# Patient Record
Sex: FEMALE | Race: BLACK OR AFRICAN AMERICAN | NOT HISPANIC OR LATINO | Employment: OTHER | URBAN - METROPOLITAN AREA
[De-identification: names, ages, dates, MRNs, and addresses within clinical notes are randomized per-mention and may not be internally consistent; named-entity substitution may affect disease eponyms.]

---

## 2023-05-15 DIAGNOSIS — I10 HYPERTENSION, UNSPECIFIED TYPE: Primary | ICD-10-CM

## 2023-05-15 RX ORDER — TRIAMTERENE/HYDROCHLOROTHIAZID 37.5-25 MG
1 TABLET ORAL DAILY
COMMUNITY
Start: 2013-03-25 | End: 2023-05-15 | Stop reason: SDUPTHER

## 2023-05-15 RX ORDER — TRIAMTERENE/HYDROCHLOROTHIAZID 37.5-25 MG
1 TABLET ORAL DAILY
Qty: 30 TABLET | Refills: 0 | Status: SHIPPED | OUTPATIENT
Start: 2023-05-15 | End: 2023-06-16 | Stop reason: SDUPTHER

## 2023-06-12 DIAGNOSIS — I10 HYPERTENSION, UNSPECIFIED TYPE: Primary | ICD-10-CM

## 2023-06-16 RX ORDER — TRIAMTERENE/HYDROCHLOROTHIAZID 37.5-25 MG
1 TABLET ORAL DAILY
Qty: 90 TABLET | Refills: 0 | Status: SHIPPED | OUTPATIENT
Start: 2023-06-16 | End: 2023-09-05 | Stop reason: SDUPTHER

## 2023-09-05 DIAGNOSIS — I10 HYPERTENSION, UNSPECIFIED TYPE: ICD-10-CM

## 2023-09-05 RX ORDER — TRIAMTERENE/HYDROCHLOROTHIAZID 37.5-25 MG
1 TABLET ORAL DAILY
Qty: 30 TABLET | Refills: 0 | Status: SHIPPED | OUTPATIENT
Start: 2023-09-05 | End: 2023-11-07 | Stop reason: SDUPTHER

## 2023-09-20 ENCOUNTER — OFFICE VISIT (OUTPATIENT)
Dept: PRIMARY CARE | Facility: CLINIC | Age: 73
End: 2023-09-20
Payer: COMMERCIAL

## 2023-09-20 VITALS
SYSTOLIC BLOOD PRESSURE: 122 MMHG | BODY MASS INDEX: 31.28 KG/M2 | OXYGEN SATURATION: 98 % | DIASTOLIC BLOOD PRESSURE: 100 MMHG | WEIGHT: 171 LBS | HEART RATE: 104 BPM

## 2023-09-20 DIAGNOSIS — M71.22 SYNOVIAL CYST OF LEFT POPLITEAL SPACE: Primary | ICD-10-CM

## 2023-09-20 DIAGNOSIS — M79.605 PAIN OF LEFT LOWER EXTREMITY: Primary | ICD-10-CM

## 2023-09-20 PROCEDURE — 99213 OFFICE O/P EST LOW 20 MIN: CPT | Performed by: STUDENT IN AN ORGANIZED HEALTH CARE EDUCATION/TRAINING PROGRAM

## 2023-09-20 RX ORDER — METHYLPREDNISOLONE 4 MG/1
TABLET ORAL
Qty: 21 TABLET | Refills: 0 | Status: SHIPPED | OUTPATIENT
Start: 2023-09-20 | End: 2023-09-27

## 2023-09-20 NOTE — PATIENT INSTRUCTIONS
Please go downstairs for ultrasound. You can get this done on the lower level in [address]. The phone number is [phone].    I would also make a sports medicine appointment with Dr. Kirsten Carey. Her number is [phone].

## 2023-09-20 NOTE — PROGRESS NOTES
Subjective   Patient ID: Anne Rodriguez is a 73 y.o. female with pmhx of HTN who presents for Knee Pain (left).    # L Knee Pain  - Started 2 weeks ago  -Posterior in location  - Hurts with movement, bending  - Edematous  - Tried NSAID, Acetaminopen for pain at home  - Improved with rest      Objective   BP (!) 122/100   Pulse 104   Wt 77.6 kg (171 lb)   SpO2 98%   BMI 31.28 kg/m²     Physical Exam  General: Well appearing, conversational, in no acute distress  CV: RRR, no murmurs  Resp: Lungs CTAB, normal work of breathing  Ext: L knee edematous and tender posteriorly. R knee not edematous or tender. No erythema present.   Skin: Warm, dry, no rashes  Neuro: Awake, alert, oriented x3, moving all 4 extremities, nonfocal, normal gait, ambulates without assistance  Psych: Appropriate mood and affect       Assessment/Plan   Anne Rodriguez is a 73 y.o. female with pmhx of HTN who presents today with L knee pain that started 2 weeks ago. Most likely Baker's cyst. The pain is in popliteal fossa and worse with movement and bending. Edema present, no erythema present. Pt denies numbness, tingling, shooting pains. Will order ultrasound to r/o DVT and assess for cyst. If Baker's cyst, will refer to sports medicine.

## 2023-09-20 NOTE — PROGRESS NOTES
Subjective   Patient ID: Anne Rodriguez is a 73 y.o. female patient of Dr. Jacobsen with history of HTN and hyperparathyroidism who presents for Knee Pain (left).  HPI    Objective   Visit Vitals  BP (!) 122/100   Pulse 104   Wt 77.6 kg (171 lb)   SpO2 98%   BMI 31.28 kg/m²   BSA 1.84 m²      Physical Exam  General: Well appearing, conversational, in no acute distress  HEENT: EOMI, PERRL, nares patent without congestion, MMM, TMs clear bilaterally   CV: RRR, no murmurs  Resp: Lungs CTAB, normal work of breathing  GI: Soft, nondistended, nontender, BS+   Ext: No lower ext swelling  Skin: Warm, dry, no rashes  Neuro: Awake, alert, oriented x3, moving all 4 extremities, nonfocal, normal gait, ambulates without assistance  Psych: Appropriate mood and affect      Assessment/Plan   Anne Rodriguez is a 73 y.o. female who presents for Knee Pain (left).  Problem List Items Addressed This Visit    None           Janine Olivo MD MPH

## 2023-10-10 PROBLEM — H04.123 BILATERAL DRY EYES: Status: ACTIVE | Noted: 2023-10-10

## 2023-10-10 PROBLEM — E66.9 CLASS 1 OBESITY WITH BODY MASS INDEX (BMI) OF 31.0 TO 31.9 IN ADULT: Status: ACTIVE | Noted: 2023-10-10

## 2023-10-10 PROBLEM — E66.811 CLASS 1 OBESITY WITH BODY MASS INDEX (BMI) OF 31.0 TO 31.9 IN ADULT: Status: ACTIVE | Noted: 2023-10-10

## 2023-10-10 PROBLEM — H52.4 HYPEROPIA OF BOTH EYES WITH ASTIGMATISM AND PRESBYOPIA: Status: ACTIVE | Noted: 2023-10-10

## 2023-10-10 PROBLEM — Z86.0100 PERSONAL HISTORY OF COLONIC POLYPS: Status: ACTIVE | Noted: 2023-10-10

## 2023-10-10 PROBLEM — R79.89 LOW VITAMIN D LEVEL: Status: ACTIVE | Noted: 2023-10-10

## 2023-10-10 PROBLEM — E04.1 SOLITARY THYROID NODULE: Status: ACTIVE | Noted: 2023-10-10

## 2023-10-10 PROBLEM — H40.003 GLAUCOMA SUSPECT OF BOTH EYES: Status: ACTIVE | Noted: 2023-10-10

## 2023-10-10 PROBLEM — N93.9 ABNORMAL UTERINE BLEEDING (AUB): Status: ACTIVE | Noted: 2023-10-10

## 2023-10-10 PROBLEM — E78.5 BORDERLINE HYPERLIPIDEMIA: Status: ACTIVE | Noted: 2023-10-10

## 2023-10-10 PROBLEM — I10 HYPERTENSION: Status: ACTIVE | Noted: 2023-10-10

## 2023-10-10 PROBLEM — H52.203 HYPEROPIA OF BOTH EYES WITH ASTIGMATISM AND PRESBYOPIA: Status: ACTIVE | Noted: 2023-10-10

## 2023-10-10 PROBLEM — M16.10 PRIMARY LOCALIZED OSTEOARTHROSIS OF THE HIP: Status: ACTIVE | Noted: 2023-10-10

## 2023-10-10 PROBLEM — Z86.010 PERSONAL HISTORY OF COLONIC POLYPS: Status: ACTIVE | Noted: 2023-10-10

## 2023-10-10 PROBLEM — H52.03 HYPEROPIA OF BOTH EYES WITH ASTIGMATISM AND PRESBYOPIA: Status: ACTIVE | Noted: 2023-10-10

## 2023-10-10 PROBLEM — G47.30 SLEEP APNEA: Status: ACTIVE | Noted: 2023-10-10

## 2023-10-10 PROBLEM — H43.393 VITREOUS SYNERESIS OF BOTH EYES: Status: ACTIVE | Noted: 2023-10-10

## 2023-10-10 RX ORDER — MELOXICAM 7.5 MG/1
1 TABLET ORAL 2 TIMES DAILY PRN
COMMUNITY
Start: 2021-08-13

## 2023-10-10 RX ORDER — VIT C/E/ZN/COPPR/LUTEIN/ZEAXAN 250MG-90MG
CAPSULE ORAL
COMMUNITY

## 2023-10-11 ENCOUNTER — HOSPITAL ENCOUNTER (OUTPATIENT)
Dept: RADIOLOGY | Facility: HOSPITAL | Age: 73
Discharge: HOME | End: 2023-10-11
Payer: MEDICARE

## 2023-10-11 ENCOUNTER — OFFICE VISIT (OUTPATIENT)
Dept: ORTHOPEDIC SURGERY | Facility: HOSPITAL | Age: 73
End: 2023-10-11
Payer: MEDICARE

## 2023-10-11 DIAGNOSIS — M17.12 ARTHRITIS OF KNEE, LEFT: Primary | ICD-10-CM

## 2023-10-11 DIAGNOSIS — M25.562 LEFT KNEE PAIN, UNSPECIFIED CHRONICITY: ICD-10-CM

## 2023-10-11 PROCEDURE — 20611 DRAIN/INJ JOINT/BURSA W/US: CPT | Mod: LT | Performed by: STUDENT IN AN ORGANIZED HEALTH CARE EDUCATION/TRAINING PROGRAM

## 2023-10-11 PROCEDURE — 2500000004 HC RX 250 GENERAL PHARMACY W/ HCPCS (ALT 636 FOR OP/ED): Performed by: STUDENT IN AN ORGANIZED HEALTH CARE EDUCATION/TRAINING PROGRAM

## 2023-10-11 PROCEDURE — 73564 X-RAY EXAM KNEE 4 OR MORE: CPT | Mod: LEFT SIDE | Performed by: RADIOLOGY

## 2023-10-11 PROCEDURE — 99204 OFFICE O/P NEW MOD 45 MIN: CPT | Performed by: STUDENT IN AN ORGANIZED HEALTH CARE EDUCATION/TRAINING PROGRAM

## 2023-10-11 PROCEDURE — 99214 OFFICE O/P EST MOD 30 MIN: CPT | Mod: GC,25 | Performed by: STUDENT IN AN ORGANIZED HEALTH CARE EDUCATION/TRAINING PROGRAM

## 2023-10-11 PROCEDURE — 2500000005 HC RX 250 GENERAL PHARMACY W/O HCPCS: Performed by: STUDENT IN AN ORGANIZED HEALTH CARE EDUCATION/TRAINING PROGRAM

## 2023-10-11 PROCEDURE — 73564 X-RAY EXAM KNEE 4 OR MORE: CPT | Mod: LT

## 2023-10-11 RX ORDER — METHYLPREDNISOLONE ACETATE 40 MG/ML
40 INJECTION, SUSPENSION INTRA-ARTICULAR; INTRALESIONAL; INTRAMUSCULAR; SOFT TISSUE
Status: COMPLETED | OUTPATIENT
Start: 2023-10-11 | End: 2023-10-11

## 2023-10-11 RX ORDER — ROPIVACAINE HYDROCHLORIDE 5 MG/ML
3 INJECTION, SOLUTION EPIDURAL; INFILTRATION; PERINEURAL
Status: COMPLETED | OUTPATIENT
Start: 2023-10-11 | End: 2023-10-11

## 2023-10-11 RX ORDER — LIDOCAINE HYDROCHLORIDE 10 MG/ML
3 INJECTION, SOLUTION EPIDURAL; INFILTRATION; INTRACAUDAL; PERINEURAL
Status: COMPLETED | OUTPATIENT
Start: 2023-10-11 | End: 2023-10-11

## 2023-10-11 RX ADMIN — LIDOCAINE HYDROCHLORIDE 3 ML: 10 INJECTION, SOLUTION EPIDURAL; INFILTRATION; INTRACAUDAL; PERINEURAL at 09:08

## 2023-10-11 RX ADMIN — METHYLPREDNISOLONE ACETATE 40 MG: 40 INJECTION, SUSPENSION INTRALESIONAL; INTRAMUSCULAR; INTRASYNOVIAL; SOFT TISSUE at 09:08

## 2023-10-11 RX ADMIN — ROPIVACAINE HYDROCHLORIDE 3 ML: 5 INJECTION EPIDURAL; INFILTRATION; PERINEURAL at 09:08

## 2023-10-11 NOTE — PROGRESS NOTES
REFERRAL SOURCE: No ref. provider found     CHIEF COMPLAINT: left knee pain    HISTORY OF PRESENT ILLNESS  Anne Rodriguez is a very pleasant 73 y.o. female with history of HTN, vitamin D deficiency who is here for evaluation of left knee pain.     10/11/2023: Left knee pain started 6 months ago without injury. She has pain mostly over her knee cap and occasionally in the back of her knee. Pain is worse when she is trying to get up from sitting to standing.  She also has pain when squatting or in deep squat position when plain with her grandkids.  Denies numbness and tingling or pain radiating down the leg.  The pain behind her knee does not significantly bother her and her lower leg swelling has resolved.  She has not had any treatment previously for her left knee pain.    MEDS    Current Outpatient Medications:     cholecalciferol (Vitamin D-3) 25 MCG (1000 UT) capsule, Vitamin D3 25 MCG (1000 UT) Oral Capsule  Refills: 0     Active, Disp: , Rfl:     diclofenac sodium 1 % kit, Apply 2-4 g to affected area 3 times daily, Disp: , Rfl:     MAGNESIUM ORAL, , Disp: , Rfl:     meloxicam (Mobic) 7.5 mg tablet, Take 1 tablet (7.5 mg) by mouth 2 times a day as needed., Disp: , Rfl:     peg 400-propylene glycol (SYSTANE) 0.4-0.3 % drops ophthalmic drops, Systane 0.4-0.3 % Ophthalmic Solution  Refills: 0     Active, Disp: , Rfl:     triamterene-hydrochlorothiazid (Maxzide-25) 37.5-25 mg tablet, Take 1 tablet by mouth once daily., Disp: 30 tablet, Rfl: 0    ALLERGIES  No Known Allergies    PAST MEDICAL HISTORY  Past Medical History:   Diagnosis Date    Pain in right knee 2021    Acute pain of right knee    Personal history of other endocrine, nutritional and metabolic disease 2018    History of thyroid disease    Unspecified glaucoma 2018    Glaucoma of both eyes       PAST SURGICAL HISTORY  Past Surgical History:   Procedure Laterality Date     SECTION, CLASSIC  2018     Section     TONSILLECTOMY  01/06/2014    Tonsillectomy With Adenoidectomy    TOTAL HIP ARTHROPLASTY  01/06/2014    Total Hip Replacement    TUBAL LIGATION  01/06/2014    Tubal Ligation    UMBILICAL HERNIA REPAIR  01/06/2014    Umbilical Hernia Repair       SOCIAL HISTORY   Social History     Socioeconomic History    Marital status: Single     Spouse name: Not on file    Number of children: Not on file    Years of education: Not on file    Highest education level: Not on file   Occupational History    Not on file   Tobacco Use    Smoking status: Not on file    Smokeless tobacco: Not on file   Substance and Sexual Activity    Alcohol use: Not on file    Drug use: Not on file    Sexual activity: Not on file   Other Topics Concern    Not on file   Social History Narrative    Not on file     Social Determinants of Health     Financial Resource Strain: Not on file   Food Insecurity: Not on file   Transportation Needs: Not on file   Physical Activity: Not on file   Stress: Not on file   Social Connections: Not on file   Intimate Partner Violence: Not on file   Housing Stability: Not on file       FAMILY HISTORY  Family History   Problem Relation Name Age of Onset    Dementia Mother      Heart disease Mother      Hypertension Mother      Bone cancer Father      Colon cancer Father      Breast cancer Paternal Grandmother         REVIEW OF SYSTEMS  Except for those mentioned in the history of present illness, and below, a complete review of systems is negative.     Review of Systems    VITALS  There were no vitals filed for this visit.    PHYSICAL EXAMINATION   GENERAL:  Awake, alert, and oriented, no apparent distress, pleasant, and cooperative  PSYC: Mood is euthymic, affect is congruent  EAR, NOSE, THROAT:  Normocephalic, atraumatic, moist membranes, anicteric sclera  LUNG: Nonlabored breathing  HEART: No clubbing or cyanosis  SKIN: No increased erythema, warmth, rashes, or concerning skin lesions  NEURO: Sensation is intact in the  bilateral lower extremities. Strength is grossly 5 out of 5 throughout the bilateral lower extremities, unless noted below.  GAIT: Non-antalgic  MUSCULOSKELETAL: Examination of the left knee: Range of motion is 0-100.  Mild effusion.  Patellar crepitus.  Tender palpation over the medial joint line and medial lateral patella facets.   Varus and valgus stress test are negative. Lachman's negative. Posterior drawer is negative. Bhumika's and meniscal grind tests are negative.     IMAGING STUDIES: Xray's left knee dated 10/11/23. I personally reviewed and interpreted these images and shared the findings with the patient. Tricompartmental Osteoarthritis of the left knee, worse in the patellofemoral compartment.     Venous Doppler ultrasound did not show evidence of DVT, but did show Baker's cyst in the left posterior knee region.    IMPRESSION  #1  Acute aspiration of chronic left knee osteoarthritis    PLAN  The following was discussed with the patient:     Anne Rodriguez is a very pleasant 73 y.o. female with history of HTN, vitamin D deficiency who is here for evaluation of left knee pain.  Her history and physical exam seem most consistent with acute aspiration of chronic left knee osteoarthritis.   -The diagnosis of knee arthritis was discussed in detail. The only cure for arthritis is a knee replacement. Without curing arthritis, we can improve the pain that the patient experiences and hopefully allow them to continue to do the activities that they enjoy.  -Physical therapy: Work on hip abductor, knee extensor, core strengthening and flexibility with PT. The patient was given a referral to physical therapy today.  -Weight loss and physical activity: The benefits of weight loss and physical activity on improving pain experienced with arthritis were discussed.  -Bracing: Knee bracing can be considered.  -Medications: Continue to take Tylenol over the counter.   -Injections: Injections, such as corticosteroid,  viscosupplementation, and PRP can be utilized. The pros, cons, risks, benefits, pre-procedure and post-procedure protocols were discussed.  He was to proceed with an ultrasound-guided left knee joint aspiration and corticosteroid injection.  Please see procedure note section for complete details.  -Follow-up in 3 months, or sooner if needed.    The patient was counseled to remain active, but avoid activities that worsen symptoms. The patient was in agreement with this plan. All questions were answered to the best of my ability.    PATIENT EDUCATION:  Education was discussed at today's appointment. A learning needs assessment was performed.    Primary learner: Anne Rodriguez  Barriers to learning: None  Preferred language: English  Learning preferences include: Seeing and doing.  Discussed: Diagnosis and treatment plan.  Demonstrated: Understanding of material discussed.  Patient education materials given: None.  Learner response: Learner demonstrated understanding.    This note was dictated using Dragon speech recognition software and was not corrected for spelling or grammatical errors.    Patient seen and examined with PM&R resident, Dr. Kaur. History, physical examination, pertinent imaging findings and the plan of care were discussed and I performed the key portions of the history, physical examination, and discussion of the plan of care. I have edited his note and agree with the findings. Dr. Kaur performed the procedure under my direct supervision. I was present for the entire procedure.     Kirsten Carey MD    [facility]   [facility]    Patient ID: Anne Rodriguez is a 73 y.o. female.    Large Jt Asp/Inj: L knee on 10/11/2023 9:08 AM  Details: ultrasound-guided  Medications: 40 mg methylPREDNISolone acetate 40 mg/mL; 3 mL lidocaine PF 10 mg/mL (1 %); 3 mL ropivacaine    Pre-Procedure Diagnosis: left knee pain, left knee osteoarthritis and  effusion  Post-Procedure Diagnosis: left knee pain, left knee osteoarthritis and effusion    Procedure: US-guided left knee aspiration and corticosteroid injection    History of Present Illness: Anne Rodriguez is a pleasant 73 y.o. female with knee pain secondary to arthritis and effusion who is here for the above procedure for improved pain control.      Medications and allergies were reviewed with the patient. No contraindications were identified.    Informed Consent:   Following denial of allergy and review of potential side effects and complications including but not necessarily limited to infection, allergic reaction, local tissue breakdown, systemic effects of corticosteroids, elevation of blood glucose, injury to soft tissue and/or nerves and seizure, the patient indicated understanding and agreed to proceed. Written consent to treatment was obtained and the patient verbalized consent for the procedure.    Procedural Details  The use of direct ultrasound visualization of the needle (rather than a non-guided injection) was required to increase patient safety by excluding inadvertent intramuscular or intratendinous placement and minimizing bleeding by avoiding osteochondral or vascular injury from the needle.  Additionally, the increased accuracy of placement may increase clinical effectiveness and will allow higher diagnostic specificity when evaluating effectiveness of this injection.    Using ultrasound, a pre-scan of the region was performed to identify the target structure.     The area was prepped with chlorhexidine, then re-examined using the same transducer, a sterile ultrasound transducer cover, and sterile ultrasound gel.    Procedural pause conducted to verify:  correct patient identity, procedure to be performed, and as applicable, correct side and site, correct patient position, and availability of implants, special equipment, or special requirements    Procedure:   Transducer: Linear array  transducer.  Patient position: Supine with the knee bent to 30 degrees.  Localization process: The suprapatellar recess was localized in a short axis view.  Local anesthesia: Local anesthesia was obtained with 3 cc of 1% lidocaine.  Needle: A 27-gauge, 1.5-inch needle was used for local anesthesia and a 18-gauge, 1.5 inch needle was used for the aspiration and injectate.  Approach: A lateral to medial, in plane, approach was used to guide the needle tip into the suprapatellar recess deep to the quadriceps tendon and superficial to the prefemoral fat pad.   Injection/Aspiration: 9 cc of normal-appearing synovial fluid was aspirated. Thereafter, the syringes were switched and a mixture of 3 cc of 0.5% ropivocaine and 1 cc of DepoMedrol (40mg/mL) was injected into the left knee joint without complication.    Post-procedural care: The patient tolerated the procedure well and reported complete pain relief during the anesthetic phase. The patient was asked to ice for improved pain control and avoid submerging the area in water for the next 48 hours to help reduce the risk of infection. The patient was instructed to call the office immediately if there are any questions or concerns.     PATIENT EDUCATION:  Education of the diagnosis and treatment plan was discussed at today's appointment. A learning needs assessment was performed. The patient demonstrated understanding.

## 2023-10-27 NOTE — PROGRESS NOTES
Physical Therapy    Physical Therapy Evaluation and Treatment      Patient Name: Anne Rodriguez  MRN: [id number]  Today's Date: 10/30/23         Assessment:  Patient presents with clinical signs and symptoms  L knee OA with + joint effusion and recent joint fluid aspiration with steroid injection .  She has a h/o bilat PAOLO 10 years ago and L hip dysfunction is relevant to her knee pain problems likely.These impairments affect ADLs, work, recreational activity, exercise, transfer ability, andambulation function that requires skilled PT intervention to resolve and enable patient to return to previous level of function. Factors that may affect progress in PT are chronic pain, obesity, medical co-morbidities,  and patient compliance.  Patient response to initial treatment of  exercise modification and education was  well understood  by Anne Rodriguez     Plan:  Treatment/Interventions: Cryotherapy, Education/ Instruction, Hot pack, Gait training, Manual therapy, Neuromuscular re-education, Self care/ home management, Therapeutic activities, Taping techniques, Therapeutic exercises  PT Plan: Skilled PT  PT Frequency: 1 time per week  Duration: 8  Onset Date: 01/01/23  Certification Period Start Date: 10/30/23  Certification Period End Date: 01/28/24  Rehab Potential: Good  Plan of Care Agreement: Patient    Current Problem:   1. Difficulty walking        2. Chronic pain of left knee            Subjective    Anne Rodriguez has insidious onset L knee pain since 1st of the year. She had steroid injection with aspiration on 10/11/23 and it helped a bit. Start up from sitting and climbing steps aggravate pain the most. She wants to learn how to exercise correctly to manage the pain. 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 She lives alone.  Prior Level of Function:   ind ADLs and house work     Objective   Cognition:  A  + O x3      Hip and ankle joint clearance:  good hip mobility, but ASLR slow and controlled on L aggravates anterior medial L hip pain.   PSLR 90 deg bilat    Posture:       bilat   genu valgus      Single leg stance:  Eyes open  R 10 sec   L  10 sec pain and wobble    Knee ROM: Flexion  AROM  R 125  L 125 deg  , Extension  AROM  R 0 L  0 deg      Knee Strength:  Flex  R 5/5   L 4/5 P,  Ext R  5/5  L 3+/5 P     Hip Strength:  Abduction  R  3+/5  L  3+/5                  Flexion R  4/5   L  4/5    Ligament testing:  Lachman's  R  L   medial collateral    R  L  lateral collateral   R  L    Dayan's   R  L    +    Accessory joint mobility: patella   tibiofemoral   proximal tibial fibular   hypomobile   hypermobile    Palpation: tenderness to medial/ lateral joint line       Gait: antalgic  on steps L LE    Swelling   circumference:  joint effusion  yes L         Special Tests:   SLR quad lag     no   squat strategy :    hip hinge       Outcome Measures:  LEFS: 62    Treatments:  There ex: Isometric hip adduction supine 25-50%                  ASLR tried but causes L anterior groin pain     OP EDUCATION:   Self management: extensive education regarding hip and knee pain generators, relevant functional anatomy, treatment program rational, self management, HEP, and POC . I suggested to Anne Rodriguez that she eliminate hip resistive exercise machines from her workout and deep water walk in pool instead at [facility].  Goals:  1. independent Home exercise program and able to modify exercises at fitness center to prevent hip and knee pain recurrence  2. Anne Rodriguez will be able to L SLR x 5 reps without hip/knee pain  3. Improve LEFS score by 4 points  3. Anne Rodriguez will be able to go up/down 1 flight of steps with minimal L  LE pain

## 2023-10-30 ENCOUNTER — EVALUATION (OUTPATIENT)
Dept: PHYSICAL THERAPY | Facility: CLINIC | Age: 73
End: 2023-10-30
Payer: COMMERCIAL

## 2023-10-30 DIAGNOSIS — G89.29 CHRONIC PAIN OF LEFT KNEE: ICD-10-CM

## 2023-10-30 DIAGNOSIS — M25.562 CHRONIC PAIN OF LEFT KNEE: ICD-10-CM

## 2023-10-30 DIAGNOSIS — R26.2 DIFFICULTY WALKING: Primary | ICD-10-CM

## 2023-10-30 PROCEDURE — 97162 PT EVAL MOD COMPLEX 30 MIN: CPT | Mod: GP | Performed by: PHYSICAL THERAPIST

## 2023-10-30 PROCEDURE — 97110 THERAPEUTIC EXERCISES: CPT | Mod: GP | Performed by: PHYSICAL THERAPIST

## 2023-10-30 ASSESSMENT — ENCOUNTER SYMPTOMS
LOSS OF SENSATION IN FEET: 0
OCCASIONAL FEELINGS OF UNSTEADINESS: 0

## 2023-10-30 NOTE — Clinical Note
October 30, 2023     Patient: Anne Rodriguez   YOB: 1950   Date of Visit: 10/30/2023       To Whom it May Concern:    Anne Rodriguez was seen in my clinic on 10/30/2023. She {Return to school/sport:80614}.    If you have any questions or concerns, please don't hesitate to call.         Sincerely,          Cesar Silva, PT        CC: No Recipients

## 2023-10-30 NOTE — Clinical Note
October 30, 2023     Patient: Anne Rodriguez   YOB: 1950   Date of Visit: 10/30/2023       To Whom It May Concern:    It is my medical opinion that Anne Rodriguez {Work release (duty restriction):72375}.    If you have any questions or concerns, please don't hesitate to call.         Sincerely,        Cesar Silva, PT    CC: No Recipients

## 2023-11-05 NOTE — PROGRESS NOTES
Subjective   Patient ID: Anne Rodriguez is a 73 y.o. female.    Patient of Dr. Riley's, followed with him for primary open angle glaucoma (POAG), last seen 5/2021. Previous history of an episode of acute iritis, seen by Dr. Harvey, resolved with course of topical prednisolone. Also with history of chronic vitreous floaters, chronic dry eyes, nuclear cataracts all both eyes (OU).     Presents today for 1 week history of eye redness, drainage, and new large floaters in the right eye. Denies eye pain, photophobia. Has been using Visine and Lumify with improvement in eye redness. Denies flashes, curtain sign. Also describes reduced vision of the right eye over the last several months, which is making it difficult for her to read.       Current Outpatient Medications (Ophthalmology pharm classes)   Medication Sig Dispense Refill    peg 400-propylene glycol (SYSTANE) 0.4-0.3 % drops ophthalmic drops Systane 0.4-0.3 % Ophthalmic Solution   Refills: 0       Active      dextran 70-hypromellose (Bion Tears) 0.1-0.3 % ophthalmic solution Administer 1 drop into both eyes 4 times a day. 15 mL 5     Current Outpatient Medications (Other)   Medication Sig Dispense Refill    cholecalciferol (Vitamin D-3) 25 MCG (1000 UT) capsule Vitamin D3 25 MCG (1000 UT) Oral Capsule   Refills: 0       Active      diclofenac sodium 1 % kit Apply 2-4 g to affected area 3 times daily      MAGNESIUM ORAL       meloxicam (Mobic) 7.5 mg tablet Take 1 tablet (7.5 mg) by mouth 2 times a day as needed.      triamterene-hydrochlorothiazid (Maxzide-25) 37.5-25 mg tablet Take 1 tablet by mouth once daily. 30 tablet 0       Objective   Base Eye Exam       Visual Acuity (Snellen - Linear)         Right Left    Dist cc 20/50 +1 20/25 +2    Dist ph cc NI       Correction: Glasses              Tonometry (Tonopen, 8:16 AM)         Right Left    Pressure 13 11              Pupils         Pupils    Right PERRL, No APD    Left PERRL, No APD              Visual  Lara         Left Right     Full Full              Extraocular Movement         Right Left     Full Full              Neuro/Psych       Oriented x3: Yes              Dilation       Both eyes: 1% Mydriacyl & 2.5% Oliver , 1.0% Mydriacyl @ 8:17 AM                  Slit Lamp and Fundus Exam       External Exam         Right Left    External Normal Normal              Slit Lamp Exam         Right Left    Lids/Lashes Normal Normal    Conjunctiva/Sclera tr inj White and quiet    Cornea 2-3+ PEE 1+ PEE    Anterior Chamber Deep and quiet Deep and quiet    Iris Round and reactive Round and reactive    Lens 1-2+ NS, 1+ CC inferonasal spoking, 1+ PSC Tr NS    Anterior Vitreous PVD PVD              Fundus Exam         Right Left    Disc healthy, sharp rim healthy, sharp rim    C/D Ratio 0.4 0.4    Macula good foveal reflex good foveal reflex    Vessels normal course and caliber normal course and caliber    Periphery mid periphery drusen mid periphery drusen                  Imaging    Macula OCT 11/09/23  Right eye (OD): Normal contour and appearance, no IRF/subretinal fluid (SRF)  Left eye (OS): Normal contour and appearance, no IRF/subretinal fluid (SRF)      Assessment/Plan     #PVD both eyes (OU)   - No tears, breaks, or holes noted on scleral depressed exam of the right eye   - OCT macula clear both eyes both eyes (OU)   - Rtc in 3-4 weeks for DFE with scleral depression   - ED precautions provided    #Dry eye syndrome  - Noted to have 2-3+ punctate epithelial erosions (PEE) right eye (OD), and 1-2+ left eye (OS), which is likely causing injection and drainage noted by patient   - Start ATs qid both eyes (OU)   - Stop Lumify, Visine      #Cataract right eye (OD)>left eye (OS)   - Noted to have 2+ NS, 1-2+ posterior subcapsular cataract (PSC), and 1+ cortical spoking right eye right eye (OD) with mild cataract left eye (OS)   - Cataract surgery discussed with patient, defer referral now while treating dry eye   - Refer at  next visit

## 2023-11-06 ENCOUNTER — OFFICE VISIT (OUTPATIENT)
Dept: OPHTHALMOLOGY | Facility: CLINIC | Age: 73
End: 2023-11-06
Payer: COMMERCIAL

## 2023-11-06 DIAGNOSIS — H43.813 POSTERIOR VITREOUS DETACHMENT, BILATERAL: Primary | ICD-10-CM

## 2023-11-06 DIAGNOSIS — H04.123 DRY EYES, BILATERAL: ICD-10-CM

## 2023-11-06 DIAGNOSIS — M17.12 ARTHRITIS OF KNEE, LEFT: ICD-10-CM

## 2023-11-06 PROBLEM — H25.811 COMBINED FORMS OF AGE-RELATED CATARACT OF RIGHT EYE: Status: ACTIVE | Noted: 2023-11-06

## 2023-11-06 ASSESSMENT — EXTERNAL EXAM - LEFT EYE: OS_EXAM: NORMAL

## 2023-11-06 ASSESSMENT — ENCOUNTER SYMPTOMS
ENDOCRINE NEGATIVE: 0
CONSTITUTIONAL NEGATIVE: 0
EYES NEGATIVE: 0
ALLERGIC/IMMUNOLOGIC NEGATIVE: 0
PSYCHIATRIC NEGATIVE: 0
RESPIRATORY NEGATIVE: 0
MUSCULOSKELETAL NEGATIVE: 0
NEUROLOGICAL NEGATIVE: 0
GASTROINTESTINAL NEGATIVE: 0
HEMATOLOGIC/LYMPHATIC NEGATIVE: 0
CARDIOVASCULAR NEGATIVE: 0

## 2023-11-06 ASSESSMENT — VISUAL ACUITY
OS_CC+: +2
OD_CC: 20/50
METHOD: SNELLEN - LINEAR
OS_CC: 20/25
CORRECTION_TYPE: GLASSES
OD_CC+: +1

## 2023-11-06 ASSESSMENT — CONF VISUAL FIELD
OD_INFERIOR_TEMPORAL_RESTRICTION: 0
OD_INFERIOR_NASAL_RESTRICTION: 0
OD_SUPERIOR_TEMPORAL_RESTRICTION: 0
OS_INFERIOR_NASAL_RESTRICTION: 0
OD_NORMAL: 1
OD_SUPERIOR_NASAL_RESTRICTION: 0
OS_SUPERIOR_TEMPORAL_RESTRICTION: 0
OS_INFERIOR_TEMPORAL_RESTRICTION: 0
OS_NORMAL: 1
OS_SUPERIOR_NASAL_RESTRICTION: 0

## 2023-11-06 ASSESSMENT — EXTERNAL EXAM - RIGHT EYE: OD_EXAM: NORMAL

## 2023-11-06 ASSESSMENT — TONOMETRY
IOP_METHOD: TONOPEN
OD_IOP_MMHG: 13
OS_IOP_MMHG: 11

## 2023-11-06 ASSESSMENT — CUP TO DISC RATIO
OS_RATIO: 0.4
OD_RATIO: 0.4

## 2023-11-06 ASSESSMENT — SLIT LAMP EXAM - LIDS
COMMENTS: NORMAL
COMMENTS: NORMAL

## 2023-11-06 NOTE — PROGRESS NOTES
Subjective:   - Unable to read, with prescription changing   - 4-5 years ago, episode of iritis  - then last week, drainage, redness, and floaters all in the right eye      Eye surgeries: none   Eyedrops currently: Visine     No anterior chamber (AC) cell noted today     #Dry eye syndrome  - Ats qid    #Cataract  Refer at next visit     #PVD both eyes (OU)   Rtc in 3-4 weeks   ED precuations provided

## 2023-11-07 DIAGNOSIS — I10 HYPERTENSION, UNSPECIFIED TYPE: Primary | ICD-10-CM

## 2023-11-07 RX ORDER — TRIAMTERENE/HYDROCHLOROTHIAZID 37.5-25 MG
1 TABLET ORAL DAILY
Qty: 30 TABLET | Refills: 0 | Status: SHIPPED | OUTPATIENT
Start: 2023-11-07 | End: 2023-12-11 | Stop reason: SDUPTHER

## 2023-11-08 NOTE — PROGRESS NOTES
Physical Therapy    Physical Therapy Evaluation and Treatment      Patient Name: Anne Rodriguez  MRN: [id number]  Today's Date: 10/30/23  Time Calculation  Start Time: 0715  Stop Time: 0755  Time Calculation (min): 40 min      Assessment:  Anne Rodriguez noted that the second set of each exercise was easier and less uncomfortable than first. She demos new Home exercise program well including nTKE and hamstring curl .     Plan:       Current Problem:   1. Difficulty walking        2. Knee pain, left              Subjective    Anne Rodriguez has tried getting pool at [facility] and it helped significantly.   Pain:   L knee pain 5/10  posterior L knee      Objective   Anne Rodriguez has posterior L knee pain when she flexes L knee to ~ 120 deg      Treatments:  There ex: Isometric hip adduction supine 50%                  Pelvic bridge 2x12                  Seated hamstring curl  black tband 2x10                  TKE black band 2x10 5 sec hold                  Recumbent bike 50 RPM at lv 7  OP EDUCATION:  Much education regarding  differentiating exercise  tension discomfort vs joint pain. Exercise  as tissue challenge to adapt biologically over time and training intensity progression. Printed exercise sheet givenContinue POC  Goals:  1. independent Home exercise program and able to modify exercises at fitness center to prevent hip and knee pain recurrence  2. Anne Rodriguez will be able to L SLR x 5 reps without hip/knee pain  3. Improve LEFS score by 4 points  3. Anne Rodriguez will be able to go up/down 1 flight of steps with minimal L  LE pain

## 2023-11-09 ENCOUNTER — TREATMENT (OUTPATIENT)
Dept: PHYSICAL THERAPY | Facility: CLINIC | Age: 73
End: 2023-11-09
Payer: MEDICARE

## 2023-11-09 DIAGNOSIS — R26.2 DIFFICULTY WALKING: Primary | ICD-10-CM

## 2023-11-09 DIAGNOSIS — M25.562 KNEE PAIN, LEFT: ICD-10-CM

## 2023-11-09 PROCEDURE — 97110 THERAPEUTIC EXERCISES: CPT | Mod: GP | Performed by: PHYSICAL THERAPIST

## 2023-11-16 NOTE — PROGRESS NOTES
"Physical Therapy    Physical Therapy Evaluation and Treatment      Patient Name: Anne Rodriguez  MRN: [id number]  Today's Date: 11/17/23  Visit 3  Time Calculation  Start Time: 0725  Stop Time: 0810  Time Calculation (min): 45 min      Assessment:  Anne Rodriguez is more confident in her exercise performance at fitness center. She still has L knee pain on descending steps and tends to perceive any LE muscle tension developed with movement as a pain vs normal stretch sensation    Plan:   Anne Rodriguez will progress Home exercise program with step up and down practice as well standing hip abductior strengthening exercise . We will followup  to monitor progress and if goals achieved we will discharge to Home exercise program and fitness center    Current Problem:   1. Difficulty walking        2. Chronic pain of left knee              Subjective    Anne Rodriguez is doing much better especially since she has gotten into the pool at fitness center. No knee pain today but she is still cautious on steps    Objective   Anne Rodriguez walked into clinic with normalized gait pattern    Treatments:  There ex: Standing iso hip abduction at wall 2 x 10 sec each side                  Pelvic bridge 2x10 hold 3 sec                  Seated hamstring curl  black tband 2x10                  TKE black band 2x10 5 sec hold                  Recumbent bike 50 RPM at lv 10 7 min                  Supine ASLR R and L x 10  There activity:                  Step up 9\" 2x10       Step down 4\" 2x10  OP EDUCATION:  Much education regarding  differentiating exercise  tension discomfort vs joint pain. Exercise  as tissue challenge to adapt biologically over time and training intensity progression. Printed exercise sheet givenContinue POC  Goals:  1. independent Home exercise program and able to modify exercises at fitness center to prevent hip and knee pain recurrence  2. Anne Rodriguez will be able to L SLR x 5 reps without hip/knee pain  3. " Improve LEFS score by 4 points  3. Anne Rodriguez will be able to go up/down 1 flight of steps with minimal L  LE pain

## 2023-11-17 ENCOUNTER — TREATMENT (OUTPATIENT)
Dept: PHYSICAL THERAPY | Facility: CLINIC | Age: 73
End: 2023-11-17
Payer: MEDICARE

## 2023-11-17 DIAGNOSIS — R26.2 DIFFICULTY WALKING: Primary | ICD-10-CM

## 2023-11-17 DIAGNOSIS — M25.562 CHRONIC PAIN OF LEFT KNEE: ICD-10-CM

## 2023-11-17 DIAGNOSIS — G89.29 CHRONIC PAIN OF LEFT KNEE: ICD-10-CM

## 2023-11-17 PROCEDURE — 97110 THERAPEUTIC EXERCISES: CPT | Mod: GP | Performed by: PHYSICAL THERAPIST

## 2023-11-17 PROCEDURE — 97530 THERAPEUTIC ACTIVITIES: CPT | Mod: GP | Performed by: PHYSICAL THERAPIST

## 2023-11-21 NOTE — PROGRESS NOTES
"Physical Therapy    Physical Therapy Evaluation and Treatment      Patient Name: Anne Rodriguez  MRN: [id number]  Today's Date: 11/24/23  Visit 4         Assessment:  Anne Rodriguez has anterior L hip pain likely caused by iliopsoas per clinical exam. We specifically treated the hip flexor hypertonus  Plan:   Anne Rodriguez will modify her HEP to focus on hip flexor stretching . She will continue in the aquatic exercises at her fitness center.  Current Problem:   1. Difficulty walking        2. Chronic pain of left knee              Subjective    Anne Rodriguez c/o anterior L hip pain when she flexes L hip rates hip pain as 7/10. . She reports that the L knee is less severe  Objective   Anne Rodriguez has a positive L iliacus TP   Treatments:  Manual therapy  Strain counter strain technique to reduce muscle hypertonus to L iliacus x 4 locations    Ther ex:  Done today  Pelvic bridge 2x10 hold 3 sec  Supine L hip flexor stretch 3 x 30 sec  Standing step back  L hip extension x10  Sci fit lv 16 at 50 RPM  Not done today   Standing iso hip abduction at wall 2 x 10 sec each side                 Seated hamstring curl  black tband 2x10                  TKE black band 2x10 5 sec hold                  Recumbent bike 50 RPM at lv 10 7 min                  Supine ASLR R and L x 10  There activity:                  Step up 9\" 2x10       Step down 4\" 2x10  OP EDUCATION:  Much education regarding  differentiating exercise  tension discomfort vs joint pain. Exercise  as tissue challenge to adapt biologically over time and training intensity progression. Printed exercise sheet givenContinue POC  Goals:  1. independent Home exercise program and able to modify exercises at fitness center to prevent hip and knee pain recurrence  2. Anne Rodriguez will be able to L SLR x 5 reps without hip/knee pain  3. Improve LEFS score by 4 points  3. Anne Rodriguez will be able to go up/down 1 flight of steps with minimal L  LE pain    "

## 2023-11-24 ENCOUNTER — TREATMENT (OUTPATIENT)
Dept: PHYSICAL THERAPY | Facility: CLINIC | Age: 73
End: 2023-11-24
Payer: MEDICARE

## 2023-11-24 DIAGNOSIS — M25.562 CHRONIC PAIN OF LEFT KNEE: ICD-10-CM

## 2023-11-24 DIAGNOSIS — G89.29 CHRONIC PAIN OF LEFT KNEE: ICD-10-CM

## 2023-11-24 DIAGNOSIS — R26.2 DIFFICULTY WALKING: Primary | ICD-10-CM

## 2023-11-24 PROCEDURE — 97140 MANUAL THERAPY 1/> REGIONS: CPT | Mod: GP | Performed by: PHYSICAL THERAPIST

## 2023-11-24 PROCEDURE — 97110 THERAPEUTIC EXERCISES: CPT | Mod: GP | Performed by: PHYSICAL THERAPIST

## 2023-11-29 NOTE — PROGRESS NOTES
"Physical Therapy    Physical Therapy Evaluation and Treatment      Patient Name: Anne Rodriguez  MRN: [id number]  Today's Date: 11/30/23  Visit 5         Assessment:  The L hip pain resolved with the exercises and treatment  last visit. Her knees are bothering her more today secondary to weather changes. Therapeutic exercise reduced knee pain to 4/10  Current Problem:   1. Difficulty walking        2. Chronic pain of left knee              Subjective    Anne Rodriguez reports that her L hip is better, but both knees are more sore secondary to weather. Knee pain is 8/10. Her pain fluctuates day to to day  Objective      Treatments:    Ther ex:  Done today  Pelvic bridge 2x10 hold 3 sec  TKE black band 2x10 5 sec hold  Standing step back  L hip extension x10  Sci fit lv 2 at 50 RPM  Cable retrowalk 5 plates x 10  Seated hamstring curl  black tband 2x10  Heel raises 2x20  Wobble board fwd/back 30x  Lateral step dip and drive 4x length of rail  Hip hinge to chair with tband around knees to facilitate proper alignment  Not done today   Standing iso hip abduction at wall 2 x 10 sec each side                 Recumbent bike 50 RPM at lv 10 7 min                  Supine ASLR R and L x 10                  Supine L hip flexor stretch 3 x 30 sec    There activity:                  Step up 9\" 2x10       Step down 4\" 2x10  OP EDUCATION:  We discussed how rapid barometric pressure changes with bad weather can cause increased pain in arthritic joints  Goals:  1. independent Home exercise program and able to modify exercises at fitness center to prevent hip and knee pain recurrence  2. Anne Rodriguez will be able to L SLR x 5 reps without hip/knee pain  3. Improve LEFS score by 4 points  3. Anne Rodriguez will be able to go up/down 1 flight of steps with minimal L  LE pain  "

## 2023-11-30 ENCOUNTER — TREATMENT (OUTPATIENT)
Dept: PHYSICAL THERAPY | Facility: CLINIC | Age: 73
End: 2023-11-30
Payer: MEDICARE

## 2023-11-30 DIAGNOSIS — M25.562 CHRONIC PAIN OF LEFT KNEE: ICD-10-CM

## 2023-11-30 DIAGNOSIS — R26.2 DIFFICULTY WALKING: Primary | ICD-10-CM

## 2023-11-30 DIAGNOSIS — G89.29 CHRONIC PAIN OF LEFT KNEE: ICD-10-CM

## 2023-11-30 PROCEDURE — 97110 THERAPEUTIC EXERCISES: CPT | Mod: GP | Performed by: PHYSICAL THERAPIST

## 2023-12-04 ENCOUNTER — OFFICE VISIT (OUTPATIENT)
Dept: OPHTHALMOLOGY | Facility: CLINIC | Age: 73
End: 2023-12-04
Payer: MEDICARE

## 2023-12-04 DIAGNOSIS — H04.123 BILATERAL DRY EYES: ICD-10-CM

## 2023-12-04 DIAGNOSIS — H40.003 GLAUCOMA SUSPECT OF BOTH EYES: ICD-10-CM

## 2023-12-04 DIAGNOSIS — H43.813 POSTERIOR VITREOUS DETACHMENT OF BOTH EYES: Primary | ICD-10-CM

## 2023-12-04 DIAGNOSIS — H25.811 COMBINED FORMS OF AGE-RELATED CATARACT OF RIGHT EYE: ICD-10-CM

## 2023-12-04 PROCEDURE — 99213 OFFICE O/P EST LOW 20 MIN: CPT | Performed by: OPHTHALMOLOGY

## 2023-12-04 PROCEDURE — 92134 CPTRZ OPH DX IMG PST SGM RTA: CPT | Mod: BILATERAL PROCEDURE | Performed by: OPHTHALMOLOGY

## 2023-12-04 ASSESSMENT — SLIT LAMP EXAM - LIDS
COMMENTS: NORMAL
COMMENTS: NORMAL

## 2023-12-04 ASSESSMENT — CONF VISUAL FIELD
OD_INFERIOR_TEMPORAL_RESTRICTION: 0
OS_NORMAL: 1
METHOD: COUNTING FINGERS
OD_SUPERIOR_TEMPORAL_RESTRICTION: 0
OS_INFERIOR_TEMPORAL_RESTRICTION: 0
OD_SUPERIOR_NASAL_RESTRICTION: 0
OD_INFERIOR_NASAL_RESTRICTION: 0
OS_INFERIOR_NASAL_RESTRICTION: 0
OS_SUPERIOR_NASAL_RESTRICTION: 0
OS_SUPERIOR_TEMPORAL_RESTRICTION: 0
OD_NORMAL: 1

## 2023-12-04 ASSESSMENT — VISUAL ACUITY
METHOD: SNELLEN - LINEAR
OD_CC: 20/50
OS_CC: 20/50
CORRECTION_TYPE: GLASSES

## 2023-12-04 ASSESSMENT — EXTERNAL EXAM - RIGHT EYE: OD_EXAM: NORMAL

## 2023-12-04 ASSESSMENT — TONOMETRY
OD_IOP_MMHG: 12
OS_IOP_MMHG: 12
IOP_METHOD: GOLDMANN APPLANATION

## 2023-12-04 ASSESSMENT — EXTERNAL EXAM - LEFT EYE: OS_EXAM: NORMAL

## 2023-12-04 ASSESSMENT — ENCOUNTER SYMPTOMS
EYES NEGATIVE: 1
CONSTITUTIONAL NEGATIVE: 0
NEUROLOGICAL NEGATIVE: 0

## 2023-12-04 ASSESSMENT — CUP TO DISC RATIO
OS_RATIO: 0.4
OD_RATIO: 0.4

## 2023-12-04 NOTE — PROGRESS NOTES
Subjective   Patient ID: Anne Rodriguez is a 73 y.o. female.    Patient of Dr. Riley's, followed with him for primary open angle glaucoma (POAG), last seen 5/2021. Previous history of an episode of acute iritis, seen by Dr. Harvey, resolved with course of topical prednisolone. Also with history of chronic vitreous floaters, chronic dry eyes, nuclear cataracts all both eyes (OU).     Presents today for 1 week history of eye redness, drainage, and new large floaters in the right eye. Denies eye pain, photophobia. Has been using Visine and Lumify with improvement in eye redness. Denies flashes, curtain sign. Also describes reduced vision of the right eye over the last several months, which is making it difficult for her to read.       Current Outpatient Medications (Ophthalmology pharm classes)   Medication Sig Dispense Refill    dextran 70-hypromellose (Bion Tears) 0.1-0.3 % ophthalmic solution Administer 1 drop into both eyes 4 times a day. 15 mL 5    peg 400-propylene glycol (SYSTANE) 0.4-0.3 % drops ophthalmic drops Systane 0.4-0.3 % Ophthalmic Solution   Refills: 0       Active       Current Outpatient Medications (Other)   Medication Sig Dispense Refill    cholecalciferol (Vitamin D-3) 25 MCG (1000 UT) capsule Vitamin D3 25 MCG (1000 UT) Oral Capsule   Refills: 0       Active      diclofenac sodium 1 % kit Apply 2-4 g to affected area 3 times daily      MAGNESIUM ORAL       meloxicam (Mobic) 7.5 mg tablet Take 1 tablet (7.5 mg) by mouth 2 times a day as needed.      triamterene-hydrochlorothiazid (Maxzide-25) 37.5-25 mg tablet Take 1 tablet by mouth once daily. 30 tablet 0       Objective   Not recorded       Imaging    Macula OCT 12/03/23  Right eye (OD): Normal contour and appearance, no IRF/subretinal fluid (SRF)  Left eye (OS): Normal contour and appearance, no IRF/subretinal fluid (SRF)      Assessment/Plan   #PVD both eyes (OU)   - No tears, breaks, or holes noted on scleral depressed exam of the  right eye   - OCT macula clear both eyes both eyes (OU)     #Dry eye syndrome  - On ATs qid both eyes (OU)   - Surface significantly improved today OU     #Cataract right eye (OD)>left eye (OS)   - Noted to have 2+ NS, 1-2+ posterior subcapsular cataract (PSC), and 1+ cortical spoking right eye right eye (OD) with mild cataract left eye (OS)   - BCVA still limited both eyes today despite normal retinal exam and improved corneal surface, cataracts may be limiting  -Will refer for cataract eval    Follow up 6-7 months retina; also cataract eval next available

## 2023-12-08 NOTE — PROGRESS NOTES
Physical Therapy    Physical Therapy Evaluation and Treatment      Patient Name: Anne Rodriguez  MRN: [id number]  Today's Date: 23  Visit 6  Time Calculation  Start Time: 715  Stop Time: 755  Time Calculation (min): 40 min    Physical Therapy    Discharge Summary    Name: Anne Rodriugez  MRN: [id number]  : 1950  Date: 23    Discharge Summary: PT    Discharge Information: Date of discharge 23, Date of last visit 23, and Number of attended visits 6    Therapy Summary: see objective and assessment    Discharge Status: see objective and assessment     Rehab Discharge Reason: Achieved all and/or the most significant goals(s)  Assessment:  Pt has achieved the goals of skilled PT and is read for discharge to Home exercise program. She demos good exercise performance and understands the rationale for each exercise.  Current Problem:   1. Difficulty walking        2. Chronic pain of left knee              Subjective    Anne Rodriguez reports that she is able to negotiate steps better and play with her grandchildren. She goes to the fitness center consistently and feels that she can continue and independent Home exercise program .L knee pain today 0-2/10  Objective    Knee ROM: Flexion  AROM  R 125  L 125 deg  , Extension  AROM  R 0 L  0 deg      Knee Strength:  Flex  R 5/5   L 4/5 P,  Ext R  5/5  L 3+/5 P     Hip Strength:  Abduction  R  4/5  L  4/5                  Flexion R  4/5   L  4/5     Palpation: tenderness to medial/ lateral joint line       Gait: full flight of steps 1 foot over other no extra pain    Swelling   circumference:  joint effusion  yes L         Special Tests:   SLR no quad lag  x 5 reps          Outcome Measures:  LEFS:   Treatments:    Ther ex:  Done today  Pelvic bridge 2x10 hold 3 sec  Standing step back  L hip extension x10  Sci fit lv 2 at 50 RPM 8 min  Cable retrowalk 5 plates x 10  Seated hamstring curl  black tband 2x10  Heel raises 2x20  Wobble board fwd/back  30x  Lateral step dip and drive 4x length of rail  Hip hinge to chair with tband around knees to facilitate proper alignment  Ther Activity  Step sequence and force direction through heel going up steps to minimize anterior knee pain  OP EDUCATION:  We reviewed primary HEP and fitness center exercises and a method to go up/down steps to minimize anterior knee pain  Goals:  1. independent Home exercise program and able to modify exercises at fitness center to prevent hip and knee pain recurrenceA  2. Anne Rodriguez will be able to L SLR x 5 reps without hip/knee painA  3. Improve LEFS score by 4 pointsA  3. Anne Rodriguez will be able to go up/down 1 flight of steps with minimal L  LE painA

## 2023-12-11 ENCOUNTER — OFFICE VISIT (OUTPATIENT)
Dept: PRIMARY CARE | Facility: CLINIC | Age: 73
End: 2023-12-11
Payer: MEDICARE

## 2023-12-11 ENCOUNTER — TREATMENT (OUTPATIENT)
Dept: PHYSICAL THERAPY | Facility: CLINIC | Age: 73
End: 2023-12-11
Payer: MEDICARE

## 2023-12-11 ENCOUNTER — LAB (OUTPATIENT)
Dept: LAB | Facility: LAB | Age: 73
End: 2023-12-11
Payer: MEDICARE

## 2023-12-11 VITALS
WEIGHT: 172.4 LBS | HEIGHT: 62 IN | BODY MASS INDEX: 31.73 KG/M2 | SYSTOLIC BLOOD PRESSURE: 137 MMHG | DIASTOLIC BLOOD PRESSURE: 79 MMHG | HEART RATE: 75 BPM

## 2023-12-11 DIAGNOSIS — R79.89 LOW VITAMIN D LEVEL: ICD-10-CM

## 2023-12-11 DIAGNOSIS — E78.00 PURE HYPERCHOLESTEROLEMIA: ICD-10-CM

## 2023-12-11 DIAGNOSIS — I10 ESSENTIAL HYPERTENSION, BENIGN: ICD-10-CM

## 2023-12-11 DIAGNOSIS — Z00.00 ROUTINE GENERAL MEDICAL EXAMINATION AT HEALTH CARE FACILITY: ICD-10-CM

## 2023-12-11 DIAGNOSIS — E21.3 HYPERPARATHYROIDISM (MULTI): ICD-10-CM

## 2023-12-11 DIAGNOSIS — R26.2 DIFFICULTY WALKING: Primary | ICD-10-CM

## 2023-12-11 DIAGNOSIS — M25.562 CHRONIC PAIN OF LEFT KNEE: ICD-10-CM

## 2023-12-11 DIAGNOSIS — Z01.419 ENCOUNTER FOR ANNUAL ROUTINE GYNECOLOGICAL EXAMINATION: ICD-10-CM

## 2023-12-11 DIAGNOSIS — Z12.11 SCREENING FOR COLORECTAL CANCER: ICD-10-CM

## 2023-12-11 DIAGNOSIS — I10 HYPERTENSION, UNSPECIFIED TYPE: ICD-10-CM

## 2023-12-11 DIAGNOSIS — Z12.31 ENCOUNTER FOR SCREENING MAMMOGRAM FOR MALIGNANT NEOPLASM OF BREAST: ICD-10-CM

## 2023-12-11 DIAGNOSIS — G89.29 CHRONIC PAIN OF LEFT KNEE: ICD-10-CM

## 2023-12-11 DIAGNOSIS — Z00.00 ROUTINE GENERAL MEDICAL EXAMINATION AT A HEALTH CARE FACILITY: Primary | ICD-10-CM

## 2023-12-11 DIAGNOSIS — Z86.010 PERSONAL HISTORY OF COLONIC POLYPS: ICD-10-CM

## 2023-12-11 DIAGNOSIS — Z12.12 SCREENING FOR COLORECTAL CANCER: ICD-10-CM

## 2023-12-11 PROBLEM — E78.5 BORDERLINE HYPERLIPIDEMIA: Status: RESOLVED | Noted: 2023-10-10 | Resolved: 2023-12-11

## 2023-12-11 LAB
ALBUMIN SERPL BCP-MCNC: 4.1 G/DL (ref 3.4–5)
ALP SERPL-CCNC: 85 U/L (ref 33–136)
ALT SERPL W P-5'-P-CCNC: 24 U/L (ref 7–45)
ANION GAP SERPL CALC-SCNC: 12 MMOL/L (ref 10–20)
AST SERPL W P-5'-P-CCNC: 26 U/L (ref 9–39)
BASOPHILS # BLD AUTO: 0.02 X10*3/UL (ref 0–0.1)
BASOPHILS NFR BLD AUTO: 0.4 %
BILIRUB SERPL-MCNC: 0.3 MG/DL (ref 0–1.2)
BUN SERPL-MCNC: 11 MG/DL (ref 6–23)
CALCIUM SERPL-MCNC: 10.3 MG/DL (ref 8.6–10.6)
CHLORIDE SERPL-SCNC: 104 MMOL/L (ref 98–107)
CHOLEST SERPL-MCNC: 192 MG/DL (ref 0–199)
CHOLESTEROL/HDL RATIO: 2.9
CO2 SERPL-SCNC: 29 MMOL/L (ref 21–32)
CREAT SERPL-MCNC: 0.89 MG/DL (ref 0.5–1.05)
EOSINOPHIL # BLD AUTO: 0.13 X10*3/UL (ref 0–0.4)
EOSINOPHIL NFR BLD AUTO: 2.9 %
ERYTHROCYTE [DISTWIDTH] IN BLOOD BY AUTOMATED COUNT: 14.2 % (ref 11.5–14.5)
ERYTHROCYTE [SEDIMENTATION RATE] IN BLOOD BY WESTERGREN METHOD: 41 MM/H (ref 0–30)
EST. AVERAGE GLUCOSE BLD GHB EST-MCNC: 120 MG/DL
GFR SERPL CREATININE-BSD FRML MDRD: 69 ML/MIN/1.73M*2
GLUCOSE SERPL-MCNC: 93 MG/DL (ref 74–99)
HBA1C MFR BLD: 5.8 %
HCT VFR BLD AUTO: 38 % (ref 36–46)
HDLC SERPL-MCNC: 66.2 MG/DL
HGB BLD-MCNC: 12.1 G/DL (ref 12–16)
IMM GRANULOCYTES # BLD AUTO: 0.01 X10*3/UL (ref 0–0.5)
IMM GRANULOCYTES NFR BLD AUTO: 0.2 % (ref 0–0.9)
IRON SATN MFR SERPL: 16 % (ref 25–45)
IRON SERPL-MCNC: 53 UG/DL (ref 35–150)
LDLC SERPL CALC-MCNC: 103 MG/DL
LYMPHOCYTES # BLD AUTO: 1.51 X10*3/UL (ref 0.8–3)
LYMPHOCYTES NFR BLD AUTO: 33.3 %
MCH RBC QN AUTO: 30 PG (ref 26–34)
MCHC RBC AUTO-ENTMCNC: 31.8 G/DL (ref 32–36)
MCV RBC AUTO: 94 FL (ref 80–100)
MONOCYTES # BLD AUTO: 0.43 X10*3/UL (ref 0.05–0.8)
MONOCYTES NFR BLD AUTO: 9.5 %
NEUTROPHILS # BLD AUTO: 2.43 X10*3/UL (ref 1.6–5.5)
NEUTROPHILS NFR BLD AUTO: 53.7 %
NON HDL CHOLESTEROL: 126 MG/DL (ref 0–149)
NRBC BLD-RTO: 0 /100 WBCS (ref 0–0)
PLATELET # BLD AUTO: 291 X10*3/UL (ref 150–450)
POTASSIUM SERPL-SCNC: 4.2 MMOL/L (ref 3.5–5.3)
PROT SERPL-MCNC: 7.8 G/DL (ref 6.4–8.2)
RBC # BLD AUTO: 4.04 X10*6/UL (ref 4–5.2)
SODIUM SERPL-SCNC: 141 MMOL/L (ref 136–145)
TIBC SERPL-MCNC: 333 UG/DL (ref 240–445)
TRIGL SERPL-MCNC: 115 MG/DL (ref 0–149)
UIBC SERPL-MCNC: 280 UG/DL (ref 110–370)
URATE SERPL-MCNC: 4.4 MG/DL (ref 2.3–6.7)
VLDL: 23 MG/DL (ref 0–40)
WBC # BLD AUTO: 4.5 X10*3/UL (ref 4.4–11.3)

## 2023-12-11 PROCEDURE — 82043 UR ALBUMIN QUANTITATIVE: CPT

## 2023-12-11 PROCEDURE — 3078F DIAST BP <80 MM HG: CPT | Performed by: INTERNAL MEDICINE

## 2023-12-11 PROCEDURE — 80053 COMPREHEN METABOLIC PANEL: CPT

## 2023-12-11 PROCEDURE — 97110 THERAPEUTIC EXERCISES: CPT | Mod: GP | Performed by: PHYSICAL THERAPIST

## 2023-12-11 PROCEDURE — 1159F MED LIST DOCD IN RCRD: CPT | Performed by: INTERNAL MEDICINE

## 2023-12-11 PROCEDURE — 1160F RVW MEDS BY RX/DR IN RCRD: CPT | Performed by: INTERNAL MEDICINE

## 2023-12-11 PROCEDURE — 82570 ASSAY OF URINE CREATININE: CPT

## 2023-12-11 PROCEDURE — 1170F FXNL STATUS ASSESSED: CPT | Performed by: INTERNAL MEDICINE

## 2023-12-11 PROCEDURE — 85025 COMPLETE CBC W/AUTO DIFF WBC: CPT

## 2023-12-11 PROCEDURE — 82607 VITAMIN B-12: CPT

## 2023-12-11 PROCEDURE — G0439 PPPS, SUBSEQ VISIT: HCPCS | Performed by: INTERNAL MEDICINE

## 2023-12-11 PROCEDURE — 93000 ELECTROCARDIOGRAM COMPLETE: CPT | Performed by: INTERNAL MEDICINE

## 2023-12-11 PROCEDURE — 3075F SYST BP GE 130 - 139MM HG: CPT | Performed by: INTERNAL MEDICINE

## 2023-12-11 PROCEDURE — 83036 HEMOGLOBIN GLYCOSYLATED A1C: CPT

## 2023-12-11 PROCEDURE — 99214 OFFICE O/P EST MOD 30 MIN: CPT | Performed by: INTERNAL MEDICINE

## 2023-12-11 PROCEDURE — 83540 ASSAY OF IRON: CPT

## 2023-12-11 PROCEDURE — 84443 ASSAY THYROID STIM HORMONE: CPT

## 2023-12-11 PROCEDURE — 85652 RBC SED RATE AUTOMATED: CPT

## 2023-12-11 PROCEDURE — 84550 ASSAY OF BLOOD/URIC ACID: CPT

## 2023-12-11 PROCEDURE — 36415 COLL VENOUS BLD VENIPUNCTURE: CPT

## 2023-12-11 PROCEDURE — 80061 LIPID PANEL: CPT

## 2023-12-11 PROCEDURE — 83550 IRON BINDING TEST: CPT

## 2023-12-11 PROCEDURE — G0101 CA SCREEN;PELVIC/BREAST EXAM: HCPCS | Performed by: INTERNAL MEDICINE

## 2023-12-11 PROCEDURE — 83970 ASSAY OF PARATHORMONE: CPT

## 2023-12-11 PROCEDURE — 82306 VITAMIN D 25 HYDROXY: CPT

## 2023-12-11 PROCEDURE — 97530 THERAPEUTIC ACTIVITIES: CPT | Mod: GP | Performed by: PHYSICAL THERAPIST

## 2023-12-11 RX ORDER — TRIAMTERENE/HYDROCHLOROTHIAZID 37.5-25 MG
1 TABLET ORAL DAILY
Qty: 90 TABLET | Refills: 0 | Status: SHIPPED | OUTPATIENT
Start: 2023-12-11 | End: 2024-04-08 | Stop reason: SDUPTHER

## 2023-12-11 ASSESSMENT — ENCOUNTER SYMPTOMS
COUGH: 0
MYALGIAS: 0
DIAPHORESIS: 0
OCCASIONAL FEELINGS OF UNSTEADINESS: 0
SINUS PAIN: 0
VOMITING: 0
VOICE CHANGE: 0
COLOR CHANGE: 0
CONSTIPATION: 0
EYE ITCHING: 0
NAUSEA: 0
ABDOMINAL PAIN: 0
PALPITATIONS: 0
ACTIVITY CHANGE: 0
RHINORRHEA: 0
ARTHRALGIAS: 1
FATIGUE: 0
DEPRESSION: 0
WHEEZING: 0
NECK PAIN: 0
HEADACHES: 0
ABDOMINAL DISTENTION: 0
HEMATURIA: 0
JOINT SWELLING: 0
HYPERACTIVE: 0
LIGHT-HEADEDNESS: 0
DIARRHEA: 0
SLEEP DISTURBANCE: 0
ADENOPATHY: 0
NERVOUS/ANXIOUS: 0
SINUS PRESSURE: 0
LOSS OF SENSATION IN FEET: 0
DYSPHORIC MOOD: 0
WEAKNESS: 0
SORE THROAT: 0
DECREASED CONCENTRATION: 0
DIZZINESS: 0
SHORTNESS OF BREATH: 0
DYSURIA: 0
NECK STIFFNESS: 0
BRUISES/BLEEDS EASILY: 0
FREQUENCY: 0
CHEST TIGHTNESS: 0
EYE DISCHARGE: 0
FEVER: 0
CHILLS: 0

## 2023-12-11 ASSESSMENT — ACTIVITIES OF DAILY LIVING (ADL)
DRESSING: INDEPENDENT
DOING_HOUSEWORK: INDEPENDENT
TAKING_MEDICATION: INDEPENDENT
GROCERY_SHOPPING: INDEPENDENT
MANAGING_FINANCES: INDEPENDENT
BATHING: INDEPENDENT

## 2023-12-11 ASSESSMENT — PATIENT HEALTH QUESTIONNAIRE - PHQ9
1. LITTLE INTEREST OR PLEASURE IN DOING THINGS: NOT AT ALL
SUM OF ALL RESPONSES TO PHQ9 QUESTIONS 1 AND 2: 0
2. FEELING DOWN, DEPRESSED OR HOPELESS: NOT AT ALL

## 2023-12-11 NOTE — PROGRESS NOTES
Subjective   Reason for Visit: Anne Rodriguez is an 73 y.o. female patient comes in today for comprehensive follow-up of medical conditions in conjunction with annual wellness visit    Ms. Rodriguez comes in today for comprehensive follow-up of medical conditions in conjunction with annual wellness visit, dictated in a separate note.  Please refer to that note for details on healthcare maintenance and screening studies.    Ms. Rodriguez comes in today for a comprehensive follow-up.  She is feeling reasonably well.  She does have some arthralgias but this is manageable.  She has been seeing her eye doctor, has cataracts and glaucoma, this has probably been her most troublesome issue currently.  She continues on her blood pressure lowering therapy, states that this has been running well managed.  She denies any specific headaches, dizziness, chest pain, palpitations, shortness of breath nor cough, nausea, vomiting, nor changes in bowel nor bladder habits.  She declines any vaccinations.  She is comfortable proceeding with routine blood work today, not fully fasting but would prefer to do well here.  She is working with physical therapy for some knee pain, this is significantly improved.  Again, she feels well in general.  She will need refills sent to her local pharmacy.        Patient Care Team:  Lucia Jacobsen MD as PCP - General  Stevenson Alvarez MD as PCP - [employer] PCP     Review of Systems   Constitutional:  Negative for activity change, chills, diaphoresis, fatigue and fever.   HENT:  Negative for congestion, ear pain, nosebleeds, postnasal drip, rhinorrhea, sinus pressure, sinus pain, sneezing, sore throat, tinnitus and voice change.    Eyes:  Positive for visual disturbance. Negative for discharge and itching.   Respiratory:  Negative for cough, chest tightness, shortness of breath and wheezing.    Cardiovascular:  Negative for chest pain, palpitations and leg swelling.   Gastrointestinal:  Negative  "for abdominal distention, abdominal pain, constipation, diarrhea, nausea and vomiting.   Endocrine: Negative for cold intolerance, heat intolerance and polyuria.   Genitourinary:  Negative for dysuria, frequency, hematuria, urgency, vaginal bleeding and vaginal discharge.   Musculoskeletal:  Positive for arthralgias. Negative for gait problem, joint swelling, myalgias, neck pain and neck stiffness.   Skin:  Negative for color change, pallor and rash.   Allergic/Immunologic: Negative for environmental allergies, food allergies and immunocompromised state.   Neurological:  Negative for dizziness, syncope, weakness, light-headedness and headaches.   Hematological:  Negative for adenopathy. Does not bruise/bleed easily.   Psychiatric/Behavioral:  Negative for behavioral problems, decreased concentration, dysphoric mood and sleep disturbance. The patient is not nervous/anxious and is not hyperactive.        Objective   Vitals:  /79   Pulse 75   Ht 1.575 m (5' 2\")   Wt 78.2 kg (172 lb 6.4 oz)   BMI 31.53 kg/m²       Physical Exam  Constitutional:       General: She is not in acute distress.     Appearance: Normal appearance. She is well-developed. She is not ill-appearing.   HENT:      Head: Normocephalic.      Right Ear: Tympanic membrane, ear canal and external ear normal.      Left Ear: Tympanic membrane, ear canal and external ear normal.      Nose: Nose normal. No congestion.      Mouth/Throat:      Mouth: Mucous membranes are moist.      Pharynx: Oropharynx is clear. No oropharyngeal exudate or posterior oropharyngeal erythema.   Eyes:      General: Lids are normal. No scleral icterus.     Extraocular Movements: Extraocular movements intact.      Conjunctiva/sclera: Conjunctivae normal.      Pupils: Pupils are equal, round, and reactive to light.   Neck:      Vascular: No carotid bruit.   Cardiovascular:      Rate and Rhythm: Normal rate and regular rhythm.      Pulses: Normal pulses.      Heart sounds: No " murmur heard.  Pulmonary:      Effort: Pulmonary effort is normal. Prolonged expiration present. No respiratory distress.      Breath sounds: Decreased air movement present. No wheezing, rhonchi or rales.   Chest:   Breasts:     Right: No mass.      Left: No mass.   Abdominal:      General: Bowel sounds are normal. There is no distension.      Palpations: Abdomen is soft. There is no mass.      Tenderness: There is no abdominal tenderness. There is no guarding or rebound.   Genitourinary:     General: Normal vulva.      Vagina: No vaginal discharge.   Musculoskeletal:         General: No swelling or tenderness.      Cervical back: Normal range of motion and neck supple. No tenderness.      Right lower leg: No edema.      Left lower leg: No edema.   Lymphadenopathy:      Cervical: No cervical adenopathy.      Upper Body:      Right upper body: No supraclavicular or axillary adenopathy.      Left upper body: No supraclavicular or axillary adenopathy.   Skin:     General: Skin is warm and dry.      Coloration: Skin is not pale.      Findings: No bruising or rash.   Neurological:      General: No focal deficit present.      Mental Status: She is alert and oriented to person, place, and time.      Cranial Nerves: No cranial nerve deficit.      Motor: No weakness.      Gait: Gait normal.      Deep Tendon Reflexes: Reflexes normal.   Psychiatric:         Mood and Affect: Mood normal.         Behavior: Behavior normal.         Judgment: Judgment normal.         Assessment/Plan   Full age-appropriate comprehensive physical exam and health care maintenance performed and discussed today.  Routine safety and preventative measures discussed including self breast exam, seatbelt use, no drinking and driving, no texting and driving, abstinence or cessation of tobacco use, routine dental and vision exams, healthy diet and regular exercise.    We will update comprehensive labs and follow-up on results once available.  Due for  mammogram.  Requisition placed.  DEXA remains up-to-date from 2020, plan on repeat 5 years total.  Due for colonoscopy.  Encouraged to schedule.  Order placed.  EKG as above.  Consider CT cardiac scoring scan  Declines vaccinations, specifically declining pneumonia vaccine, shingles vaccine series, flu shot nor COVID-vaccine.  Have also discussed guidelines for RSV vaccine and tetanus vaccine.    In addition to the above-mentioned healthcare maintenance and screening studies, the following were discussed and assessed in detail today:  1.  Hypertension: Reasonable control.  Continue on current therapy.  Update BMP today.  Refill sent to local pharmacy.  2.  Personal history colon polyps with family history of colon cancer: Again strongly encouraged scheduling colonoscopy.  3.  Thyroid nodule: Biopsy from ENT in 2020 looked reassuring.  4.  Hyperparathyroidism: Update PTH levels with labs.  5.  Vitamin D deficiency: Continue monitoring labs.  DEXA remains up-to-date until 2025.  6.  Abnormal breath sounds: Concern for abnormalities based on exam, decreased air movements and prolonged expirations at the apices.  Patient states that she is a non-smoker, but would never specify whether she used to smoke.  Could consider chest x-ray at a minimum, unless documented smoking history, would not be able to proceed with CT lung cancer screening.  Will continue to discuss with patient.    She should be seen in 6 months for a comprehensive follow-up.  She is to call with any questions prior to that time.  Problem List Items Addressed This Visit    None  Visit Diagnoses       Routine general medical examination at a health care facility    -  Primary    Relevant Orders    ECG 12 lead (Clinic Performed)    Routine general medical examination at health care facility

## 2023-12-11 NOTE — PATIENT INSTRUCTIONS
We will follow up on all comprehensive blood work once results are available and make any recommendations based on these results as may be indicated.  Please continue with routine gynecologic exams and annual mammograms.  Please schedule your colonoscopy for follow-up screening as well.  Please consider updating routine vaccinations, we would be happy to assist.  If you have any questions or need additional refills, please feel free to contact us.  Otherwise, we are happy to see you back in 6 months for a brief follow-up.

## 2023-12-12 LAB
25(OH)D3 SERPL-MCNC: 94 NG/ML (ref 30–100)
CREAT UR-MCNC: 31.6 MG/DL (ref 20–320)
MICROALBUMIN UR-MCNC: <7 MG/L
MICROALBUMIN/CREAT UR: NORMAL MG/G{CREAT}
PTH-INTACT SERPL-MCNC: 104.1 PG/ML (ref 18.5–88)
TSH SERPL-ACNC: 1.29 MIU/L (ref 0.44–3.98)
VIT B12 SERPL-MCNC: 473 PG/ML (ref 211–911)

## 2024-02-19 ENCOUNTER — HOSPITAL ENCOUNTER (OUTPATIENT)
Dept: RADIOLOGY | Facility: CLINIC | Age: 74
Discharge: HOME | End: 2024-02-19
Payer: COMMERCIAL

## 2024-02-19 DIAGNOSIS — Z12.31 ENCOUNTER FOR SCREENING MAMMOGRAM FOR MALIGNANT NEOPLASM OF BREAST: ICD-10-CM

## 2024-02-19 PROCEDURE — 77063 BREAST TOMOSYNTHESIS BI: CPT | Performed by: RADIOLOGY

## 2024-02-19 PROCEDURE — 77067 SCR MAMMO BI INCL CAD: CPT

## 2024-02-19 PROCEDURE — 77067 SCR MAMMO BI INCL CAD: CPT | Performed by: RADIOLOGY

## 2024-02-26 ENCOUNTER — APPOINTMENT (OUTPATIENT)
Dept: OPHTHALMOLOGY | Facility: CLINIC | Age: 74
End: 2024-02-26
Payer: MEDICARE

## 2024-03-07 ENCOUNTER — APPOINTMENT (OUTPATIENT)
Dept: OPHTHALMOLOGY | Facility: CLINIC | Age: 74
End: 2024-03-07
Payer: MEDICARE

## 2024-04-08 DIAGNOSIS — I10 HYPERTENSION, UNSPECIFIED TYPE: Primary | ICD-10-CM

## 2024-04-08 RX ORDER — TRIAMTERENE/HYDROCHLOROTHIAZID 37.5-25 MG
1 TABLET ORAL DAILY
Qty: 90 TABLET | Refills: 0 | Status: SHIPPED | OUTPATIENT
Start: 2024-04-08

## 2024-06-10 ENCOUNTER — OFFICE VISIT (OUTPATIENT)
Dept: OPHTHALMOLOGY | Facility: CLINIC | Age: 74
End: 2024-06-10
Payer: COMMERCIAL

## 2024-06-10 DIAGNOSIS — H43.813 POSTERIOR VITREOUS DETACHMENT OF BOTH EYES: Primary | ICD-10-CM

## 2024-06-10 PROCEDURE — 92134 CPTRZ OPH DX IMG PST SGM RTA: CPT | Performed by: OPHTHALMOLOGY

## 2024-06-10 PROCEDURE — 99213 OFFICE O/P EST LOW 20 MIN: CPT | Performed by: OPHTHALMOLOGY

## 2024-06-10 ASSESSMENT — REFRACTION_WEARINGRX
OS_AXIS: 140
OS_ADD: +2.50
OD_ADD: +2.50
OS_CYLINDER: -0.50
OS_SPHERE: +1.25
OD_CYLINDER: -1.00
OD_AXIS: 110
OD_SPHERE: +2.25

## 2024-06-10 ASSESSMENT — VISUAL ACUITY
OD_CC+: -2
METHOD: SNELLEN - LINEAR
OD_CC: 20/30
CORRECTION_TYPE: GLASSES
OS_CC+: -2
OS_CC: 20/25

## 2024-06-10 ASSESSMENT — CONF VISUAL FIELD
OS_INFERIOR_NASAL_RESTRICTION: 0
OD_INFERIOR_TEMPORAL_RESTRICTION: 0
OD_INFERIOR_NASAL_RESTRICTION: 0
OS_NORMAL: 1
OS_SUPERIOR_NASAL_RESTRICTION: 0
METHOD: COUNTING FINGERS
OS_INFERIOR_TEMPORAL_RESTRICTION: 0
OS_SUPERIOR_TEMPORAL_RESTRICTION: 0
OD_NORMAL: 1
OD_SUPERIOR_TEMPORAL_RESTRICTION: 0
OD_SUPERIOR_NASAL_RESTRICTION: 0

## 2024-06-10 ASSESSMENT — PACHYMETRY
OS_CT(UM): 538
OD_CT(UM): 544

## 2024-06-10 ASSESSMENT — CUP TO DISC RATIO
OD_RATIO: 0.4
OS_RATIO: 0.4

## 2024-06-10 ASSESSMENT — TONOMETRY
OD_IOP_MMHG: 13
OS_IOP_MMHG: 13
IOP_METHOD: GOLDMANN APPLANATION

## 2024-06-10 ASSESSMENT — EXTERNAL EXAM - LEFT EYE: OS_EXAM: NORMAL

## 2024-06-10 ASSESSMENT — EXTERNAL EXAM - RIGHT EYE: OD_EXAM: NORMAL

## 2024-06-10 ASSESSMENT — SLIT LAMP EXAM - LIDS
COMMENTS: NORMAL
COMMENTS: NORMAL

## 2024-06-10 NOTE — PROGRESS NOTES
Subjective   Patient ID: Anne Rodriguez is a 73 y.o. female.    Patient of Dr. Riley's, followed with him for primary open angle glaucoma (POAG), last seen 5/2021. Previous history of an episode of acute iritis, seen by Dr. Harvey, resolved with course of topical prednisolone. Also with history of chronic vitreous floaters, chronic dry eyes, nuclear cataracts all both eyes (OU).     Presents today for 1 week history of eye redness, drainage, and new large floaters in the right eye. Denies eye pain, photophobia. Has been using Visine and Lumify with improvement in eye redness. Denies flashes, curtain sign. Also describes reduced vision of the right eye over the last several months, which is making it difficult for her to read.       Current Outpatient Medications (Ophthalmology pharm classes)   Medication Sig Dispense Refill    dextran 70-hypromellose (Bion Tears) 0.1-0.3 % ophthalmic solution Administer 1 drop into both eyes 4 times a day. 15 mL 5    peg 400-propylene glycol (SYSTANE) 0.4-0.3 % drops ophthalmic drops Systane 0.4-0.3 % Ophthalmic Solution   Refills: 0       Active       Current Outpatient Medications (Other)   Medication Sig Dispense Refill    cholecalciferol (Vitamin D-3) 25 MCG (1000 UT) capsule Vitamin D3 25 MCG (1000 UT) Oral Capsule   Refills: 0       Active      diclofenac sodium 1 % kit Apply 2-4 g to affected area 3 times daily      MAGNESIUM ORAL       meloxicam (Mobic) 7.5 mg tablet Take 1 tablet (7.5 mg) by mouth 2 times a day as needed.      triamterene-hydrochlorothiazid (Maxzide-25) 37.5-25 mg tablet Take 1 tablet by mouth once daily. 90 tablet 0       Objective   Base Eye Exam       Visual Acuity (Snellen - Linear)         Right Left    Dist cc 20/30 -2 20/25 -2      Correction: Glasses              Tonometry (Goldmann Applanation, 8:19 AM)         Right Left    Pressure 13 13              Pupils         Pupils    Right PERRL, No APD    Left PERRL, No APD              Visual  Lara (Counting fingers)         Left Right     Full Full              Extraocular Movement         Right Left     Full Full              Neuro/Psych       Oriented x3: Yes    Mood/Affect: Normal              Dilation       Both eyes: 1% Mydriacyl & 2.5% Oliver  @ 8:19 AM                  Slit Lamp and Fundus Exam       External Exam         Right Left    External Normal Normal              Slit Lamp Exam         Right Left    Lids/Lashes Normal Normal    Conjunctiva/Sclera tr inj White and quiet    Cornea tr PEE tr PEE    Anterior Chamber Deep and quiet Deep and quiet    Iris Round and reactive Round and reactive    Lens 1-2+ NS, 1+ CC inferonasal spoking, 1+ PSC Tr NS    Anterior Vitreous PVD PVD              Fundus Exam         Right Left    Disc healthy, sharp rim healthy, sharp rim    C/D Ratio 0.4 0.4    Macula good foveal reflex good foveal reflex    Vessels normal course and caliber normal course and caliber    Periphery mid periphery drusen mid periphery drusen, small nevus with drusen and flat along superior arcade in ST periphery                  Refraction       Wearing Rx         Sphere Cylinder Axis Add    Right +2.25 -1.00 110 +2.50    Left +1.25 -0.50 140 +2.50                  Imaging    Macula OCT 06/10/24  Right eye (OD): Normal contour and appearance, no IRF/subretinal fluid (SRF)  Left eye (OS): Normal contour and appearance, no IRF/subretinal fluid (SRF)      Assessment/Plan     Last seen Dec 2023    #PVD both eyes (OU)   - Stable single persistent floater right eye, no new flashes, no floaters OS  - No tears, breaks, or holes noted on  exam both eyes   - OCT macula clear both eyes both eyes (OU)     #Choroidal nevus left eye  -Small, flat, drusen, no pigment, not concerning    #Dry eye syndrome  - On ATs qid both eyes (OU)      #Cataract right eye (OD)>left eye (OS)   - Noted to have 2+ NS, 1-2+ posterior subcapsular cataract (PSC), and 1+ cortical spoking right eye right eye (OD) with mild  cataract left eye (OS)   -BCVA was limited last time byt improved today, patient feels likely was secondayr to dryeness  - Cataracts not bothering patient at this time, prefers to monitor    1 year

## 2024-07-02 ENCOUNTER — TELEPHONE (OUTPATIENT)
Dept: PRIMARY CARE | Facility: CLINIC | Age: 74
End: 2024-07-02

## 2024-07-04 DIAGNOSIS — Z12.12 SCREENING FOR COLORECTAL CANCER: Primary | ICD-10-CM

## 2024-07-04 DIAGNOSIS — Z12.11 SCREENING FOR COLORECTAL CANCER: Primary | ICD-10-CM

## 2024-07-08 DIAGNOSIS — Z12.11 COLON CANCER SCREENING: ICD-10-CM

## 2024-07-08 RX ORDER — POLYETHYLENE GLYCOL 3350, SODIUM SULFATE ANHYDROUS, SODIUM BICARBONATE, SODIUM CHLORIDE, POTASSIUM CHLORIDE 236; 22.74; 6.74; 5.86; 2.97 G/4L; G/4L; G/4L; G/4L; G/4L
POWDER, FOR SOLUTION ORAL
Qty: 4000 ML | Refills: 0 | Status: SHIPPED | OUTPATIENT
Start: 2024-07-08

## 2024-07-22 ENCOUNTER — APPOINTMENT (OUTPATIENT)
Dept: PRIMARY CARE | Facility: CLINIC | Age: 74
End: 2024-07-22
Payer: COMMERCIAL

## 2024-07-22 VITALS — SYSTOLIC BLOOD PRESSURE: 126 MMHG | DIASTOLIC BLOOD PRESSURE: 75 MMHG

## 2024-07-22 DIAGNOSIS — E21.3 HYPERPARATHYROIDISM (MULTI): ICD-10-CM

## 2024-07-22 DIAGNOSIS — I10 ESSENTIAL HYPERTENSION, BENIGN: Primary | ICD-10-CM

## 2024-07-22 DIAGNOSIS — I10 HYPERTENSION, UNSPECIFIED TYPE: ICD-10-CM

## 2024-07-22 PROCEDURE — 3074F SYST BP LT 130 MM HG: CPT | Performed by: INTERNAL MEDICINE

## 2024-07-22 PROCEDURE — 99213 OFFICE O/P EST LOW 20 MIN: CPT | Performed by: INTERNAL MEDICINE

## 2024-07-22 PROCEDURE — 3078F DIAST BP <80 MM HG: CPT | Performed by: INTERNAL MEDICINE

## 2024-07-22 RX ORDER — TRIAMTERENE/HYDROCHLOROTHIAZID 37.5-25 MG
1 TABLET ORAL DAILY
Qty: 90 TABLET | Refills: 0 | Status: SHIPPED | OUTPATIENT
Start: 2024-07-22

## 2024-07-22 NOTE — PROGRESS NOTES
Subjective   Patient ID: Anne Rodriguez is a 73 y.o. female who presents for No chief complaint on file..    HPI follow-up visit no chest pain no shortness of breath Home blood pressures have been greater than 140/80 in the she has been monitoring of them discussed with third-base and calcium levels as well as parathyroid she had a history of the same no headaches or constipation    Review of Systems    Objective   There were no vitals taken for this visit.    Physical Exam vital signs noted alert and oriented x 3 NCAT no JVD chest clear to auscultation CV regular rate and rhythm S1-S2 extremities no clubbing cyanosis or edema normal distal pulses    Assessment/Plan impression hypertension hyperparathyroidism  Plan set up with endocrinology requisition made based on prior recommendations discussed with water consumption discussed with her hydrochlorothiazide can also sometimes occur with elevated calcium levels and calcium levels being associated with blood pressure diet exercise weight loss she is not really drinking that much water per day perhaps 1 quart advised on increasing as above then follow-up with Dr. mishra

## 2024-08-16 ENCOUNTER — APPOINTMENT (OUTPATIENT)
Dept: PRIMARY CARE | Facility: CLINIC | Age: 74
End: 2024-08-16
Payer: COMMERCIAL

## 2024-09-06 ENCOUNTER — APPOINTMENT (OUTPATIENT)
Dept: PRIMARY CARE | Facility: CLINIC | Age: 74
End: 2024-09-06
Payer: COMMERCIAL

## 2024-09-06 ENCOUNTER — LAB (OUTPATIENT)
Dept: LAB | Facility: LAB | Age: 74
End: 2024-09-06
Payer: COMMERCIAL

## 2024-09-06 VITALS
WEIGHT: 174.4 LBS | HEART RATE: 84 BPM | SYSTOLIC BLOOD PRESSURE: 136 MMHG | BODY MASS INDEX: 31.9 KG/M2 | DIASTOLIC BLOOD PRESSURE: 84 MMHG

## 2024-09-06 DIAGNOSIS — I10 ESSENTIAL HYPERTENSION, BENIGN: ICD-10-CM

## 2024-09-06 DIAGNOSIS — E21.3 HYPERPARATHYROIDISM (MULTI): ICD-10-CM

## 2024-09-06 DIAGNOSIS — I10 ESSENTIAL HYPERTENSION, BENIGN: Primary | ICD-10-CM

## 2024-09-06 DIAGNOSIS — M17.0 PRIMARY OSTEOARTHRITIS OF BOTH KNEES: ICD-10-CM

## 2024-09-06 PROBLEM — H20.00 ACUTE IRITIS OF RIGHT EYE: Status: RESOLVED | Noted: 2024-09-06 | Resolved: 2024-09-06

## 2024-09-06 PROBLEM — N95.0 POSTMENOPAUSAL BLEEDING: Status: RESOLVED | Noted: 2024-09-06 | Resolved: 2024-09-06

## 2024-09-06 LAB
ANION GAP SERPL CALC-SCNC: 10 MMOL/L (ref 10–20)
BUN SERPL-MCNC: 8 MG/DL (ref 6–23)
CA-I BLD-SCNC: 1.39 MMOL/L (ref 1.1–1.33)
CALCIUM SERPL-MCNC: 10.6 MG/DL (ref 8.6–10.6)
CHLORIDE SERPL-SCNC: 103 MMOL/L (ref 98–107)
CO2 SERPL-SCNC: 31 MMOL/L (ref 21–32)
CREAT SERPL-MCNC: 0.83 MG/DL (ref 0.5–1.05)
EGFRCR SERPLBLD CKD-EPI 2021: 74 ML/MIN/1.73M*2
GLUCOSE SERPL-MCNC: 91 MG/DL (ref 74–99)
POTASSIUM SERPL-SCNC: 3.8 MMOL/L (ref 3.5–5.3)
PTH-INTACT SERPL-MCNC: 78.5 PG/ML (ref 18.5–88)
SODIUM SERPL-SCNC: 140 MMOL/L (ref 136–145)

## 2024-09-06 PROCEDURE — 83970 ASSAY OF PARATHORMONE: CPT

## 2024-09-06 PROCEDURE — 99214 OFFICE O/P EST MOD 30 MIN: CPT | Performed by: INTERNAL MEDICINE

## 2024-09-06 PROCEDURE — 36415 COLL VENOUS BLD VENIPUNCTURE: CPT

## 2024-09-06 PROCEDURE — 82330 ASSAY OF CALCIUM: CPT

## 2024-09-06 PROCEDURE — 1160F RVW MEDS BY RX/DR IN RCRD: CPT | Performed by: INTERNAL MEDICINE

## 2024-09-06 PROCEDURE — 1159F MED LIST DOCD IN RCRD: CPT | Performed by: INTERNAL MEDICINE

## 2024-09-06 PROCEDURE — 3079F DIAST BP 80-89 MM HG: CPT | Performed by: INTERNAL MEDICINE

## 2024-09-06 PROCEDURE — 80048 BASIC METABOLIC PNL TOTAL CA: CPT

## 2024-09-06 PROCEDURE — 3075F SYST BP GE 130 - 139MM HG: CPT | Performed by: INTERNAL MEDICINE

## 2024-09-06 ASSESSMENT — ENCOUNTER SYMPTOMS
DIZZINESS: 0
ARTHRALGIAS: 1
ABDOMINAL DISTENTION: 0
WHEEZING: 0
CONSTIPATION: 0
PALPITATIONS: 0
DIARRHEA: 0
CHEST TIGHTNESS: 0
SHORTNESS OF BREATH: 0
LIGHT-HEADEDNESS: 0
COUGH: 0
HEADACHES: 0
ABDOMINAL PAIN: 0
NAUSEA: 0

## 2024-09-06 NOTE — PATIENT INSTRUCTIONS
We will follow up on all comprehensive blood work once results are available and make any recommendations based on these results as may be indicated.  If calcium levels remain elevated, we will place a referral back to an endocrine specialist for you.  Please feel free to reach out to your orthopedic specialist for further management options for the knee pain.  If you have any questions, please contact us.  Otherwise, we are happy to see you back at the end of the year for your wellness visit.

## 2024-09-27 ENCOUNTER — APPOINTMENT (OUTPATIENT)
Dept: GASTROENTEROLOGY | Facility: EXTERNAL LOCATION | Age: 74
End: 2024-09-27
Payer: COMMERCIAL

## 2024-09-27 DIAGNOSIS — Z86.0100 PERSONAL HISTORY OF COLONIC POLYPS: ICD-10-CM

## 2024-09-27 DIAGNOSIS — Z86.010 PERSONAL HISTORY OF COLONIC POLYPS: ICD-10-CM

## 2024-09-27 DIAGNOSIS — D12.3 BENIGN NEOPLASM OF TRANSVERSE COLON: Primary | ICD-10-CM

## 2024-09-27 DIAGNOSIS — Z12.12 SCREENING FOR COLORECTAL CANCER: ICD-10-CM

## 2024-09-27 DIAGNOSIS — Z12.11 SCREENING FOR COLORECTAL CANCER: ICD-10-CM

## 2024-09-27 PROCEDURE — 88305 TISSUE EXAM BY PATHOLOGIST: CPT

## 2024-09-27 PROCEDURE — 88305 TISSUE EXAM BY PATHOLOGIST: CPT | Performed by: STUDENT IN AN ORGANIZED HEALTH CARE EDUCATION/TRAINING PROGRAM

## 2024-09-27 PROCEDURE — 45385 COLONOSCOPY W/LESION REMOVAL: CPT | Performed by: INTERNAL MEDICINE

## 2024-09-27 PROCEDURE — G0105 COLORECTAL SCRN; HI RISK IND: HCPCS | Performed by: INTERNAL MEDICINE

## 2024-09-30 ENCOUNTER — LAB REQUISITION (OUTPATIENT)
Dept: LAB | Facility: HOSPITAL | Age: 74
End: 2024-09-30
Payer: COMMERCIAL

## 2024-10-12 LAB
LABORATORY COMMENT REPORT: NORMAL
PATH REPORT.FINAL DX SPEC: NORMAL
PATH REPORT.GROSS SPEC: NORMAL
PATH REPORT.RELEVANT HX SPEC: NORMAL
PATH REPORT.TOTAL CANCER: NORMAL

## 2024-10-17 DIAGNOSIS — I10 HYPERTENSION, UNSPECIFIED TYPE: ICD-10-CM

## 2024-10-20 RX ORDER — TRIAMTERENE/HYDROCHLOROTHIAZID 37.5-25 MG
1 TABLET ORAL DAILY
Qty: 90 TABLET | Refills: 1 | Status: SHIPPED | OUTPATIENT
Start: 2024-10-20

## 2025-05-16 ENCOUNTER — APPOINTMENT (OUTPATIENT)
Dept: PRIMARY CARE | Facility: CLINIC | Age: 75
End: 2025-05-16
Payer: COMMERCIAL

## 2025-05-16 VITALS
BODY MASS INDEX: 31.28 KG/M2 | HEIGHT: 62 IN | OXYGEN SATURATION: 95 % | SYSTOLIC BLOOD PRESSURE: 122 MMHG | HEART RATE: 90 BPM | DIASTOLIC BLOOD PRESSURE: 74 MMHG | WEIGHT: 170 LBS

## 2025-05-16 DIAGNOSIS — Z00.00 ROUTINE GENERAL MEDICAL EXAMINATION AT HEALTH CARE FACILITY: ICD-10-CM

## 2025-05-16 DIAGNOSIS — Z78.0 POSTMENOPAUSAL ESTROGEN DEFICIENCY: ICD-10-CM

## 2025-05-16 DIAGNOSIS — E78.00 PURE HYPERCHOLESTEROLEMIA: ICD-10-CM

## 2025-05-16 DIAGNOSIS — Z12.31 ENCOUNTER FOR SCREENING MAMMOGRAM FOR MALIGNANT NEOPLASM OF BREAST: ICD-10-CM

## 2025-05-16 DIAGNOSIS — I10 ESSENTIAL HYPERTENSION, BENIGN: ICD-10-CM

## 2025-05-16 DIAGNOSIS — E21.3 HYPERPARATHYROIDISM (MULTI): ICD-10-CM

## 2025-05-16 DIAGNOSIS — R79.89 LOW VITAMIN D LEVEL: ICD-10-CM

## 2025-05-16 DIAGNOSIS — Z00.00 ROUTINE GENERAL MEDICAL EXAMINATION AT A HEALTH CARE FACILITY: Primary | ICD-10-CM

## 2025-05-16 PROBLEM — N93.9 ABNORMAL UTERINE BLEEDING (AUB): Status: RESOLVED | Noted: 2023-10-10 | Resolved: 2025-05-16

## 2025-05-16 ASSESSMENT — ENCOUNTER SYMPTOMS
CONSTIPATION: 0
SINUS PRESSURE: 0
BACK PAIN: 0
NAUSEA: 0
DECREASED CONCENTRATION: 0
VOICE CHANGE: 0
OCCASIONAL FEELINGS OF UNSTEADINESS: 0
WHEEZING: 0
DIARRHEA: 0
ARTHRALGIAS: 1
VOMITING: 0
WEAKNESS: 0
DIFFICULTY URINATING: 0
HEMATURIA: 0
DEPRESSION: 0
COUGH: 0
NECK STIFFNESS: 0
APPETITE CHANGE: 0
FREQUENCY: 0
SINUS PAIN: 0
LOSS OF SENSATION IN FEET: 0
RHINORRHEA: 0
DYSURIA: 0
MYALGIAS: 0
ABDOMINAL DISTENTION: 0
FATIGUE: 0
NECK PAIN: 0
ACTIVITY CHANGE: 0
LIGHT-HEADEDNESS: 0
SLEEP DISTURBANCE: 0
ABDOMINAL PAIN: 0
DYSPHORIC MOOD: 0
ADENOPATHY: 0
EYE DISCHARGE: 0
NERVOUS/ANXIOUS: 0
SHORTNESS OF BREATH: 0
FEVER: 0
CHILLS: 0
BRUISES/BLEEDS EASILY: 0
PALPITATIONS: 0
CHEST TIGHTNESS: 0
DIZZINESS: 0
SEIZURES: 0
SORE THROAT: 0
HEADACHES: 0
EYE ITCHING: 0
COLOR CHANGE: 0
HYPERACTIVE: 0

## 2025-05-16 ASSESSMENT — ACTIVITIES OF DAILY LIVING (ADL)
DOING_HOUSEWORK: INDEPENDENT
GROCERY_SHOPPING: INDEPENDENT
BATHING: INDEPENDENT
MANAGING_FINANCES: INDEPENDENT
TAKING_MEDICATION: INDEPENDENT
DRESSING: INDEPENDENT

## 2025-05-16 NOTE — PROGRESS NOTES
Subjective   Reason for Visit: Anne Rodriguez is an 74 y.o. female patient comes in today for comprehensive physical and follow-up of medical conditions in conjunction with annual wellness visit    Ms. Rodriguez comes in today for comprehensive physical and follow-up of medical conditions in conjunction with annual wellness visit, dictated in a separate subsection.  Please refer to that portion of the note for details on healthcare maintenance and screening studies.  She is feeling quite well.  She did have a significant fall in the spring when she tripped over something and fell down her basement steps.  She did have this evaluated through [facility] and has recovered well.  Thankfully hip joints, although painful, did not show any disruption of her hardware after her joint replacement surgery.  She did not have loss of consciousness or head injury.  She feels well.  She denies any headaches, dizziness, chest pain or palpitations, shortness of breath nor cough, nausea, vomiting, nor changes in bowel nor bladder habits.  Blood pressure at home remains well-managed and she continues on her Maxide as her only prescription medication.  She is comfortable updating comprehensive lab studies.  She declines vaccines but does try to keep up with other healthcare maintenance studies.        Patient Care Team:  Lucia Jacobsen MD as PCP - General  Lucia Jacobsen MD as PCP - [employer] PCP  Stevenson Alvarez MD as PCP - [employer] PCP     Review of Systems   Constitutional:  Negative for activity change, appetite change, chills, fatigue and fever.   HENT:  Negative for congestion, ear pain, postnasal drip, rhinorrhea, sinus pressure, sinus pain, sneezing, sore throat, tinnitus and voice change.    Eyes:  Negative for discharge, itching and visual disturbance.   Respiratory:  Negative for cough, chest tightness, shortness of breath and wheezing.    Cardiovascular:  Negative for chest pain, palpitations and leg  "swelling.   Gastrointestinal:  Negative for abdominal distention, abdominal pain, constipation, diarrhea, nausea and vomiting.   Endocrine: Negative for cold intolerance, heat intolerance and polyuria.   Genitourinary:  Negative for difficulty urinating, dysuria, frequency, hematuria, urgency, vaginal bleeding and vaginal discharge.   Musculoskeletal:  Positive for arthralgias. Negative for back pain, myalgias, neck pain and neck stiffness.   Skin:  Negative for color change, pallor and rash.   Allergic/Immunologic: Negative for environmental allergies, food allergies and immunocompromised state.   Neurological:  Negative for dizziness, seizures, syncope, weakness, light-headedness and headaches.   Hematological:  Negative for adenopathy. Does not bruise/bleed easily.   Psychiatric/Behavioral:  Negative for behavioral problems, decreased concentration, dysphoric mood and sleep disturbance. The patient is not nervous/anxious and is not hyperactive.        Objective   Vitals:  /82   Pulse 90   Ht 1.575 m (5' 2\")   Wt 77.1 kg (170 lb)   SpO2 95%   BMI 31.09 kg/m²       Physical Exam  Constitutional:       General: She is not in acute distress.     Appearance: Normal appearance. She is well-developed. She is not ill-appearing.   HENT:      Head: Normocephalic.      Right Ear: External ear normal. There is impacted cerumen.      Left Ear: External ear normal. There is impacted cerumen.      Nose: Nose normal.      Mouth/Throat:      Mouth: Mucous membranes are moist.      Pharynx: Oropharynx is clear. No oropharyngeal exudate or posterior oropharyngeal erythema.   Eyes:      General: Lids are normal. No scleral icterus.     Extraocular Movements: Extraocular movements intact.      Conjunctiva/sclera: Conjunctivae normal.      Pupils: Pupils are equal, round, and reactive to light.   Neck:      Vascular: No carotid bruit.   Cardiovascular:      Rate and Rhythm: Normal rate and regular rhythm.      Pulses: " Normal pulses.      Heart sounds: No murmur heard.     No gallop.   Pulmonary:      Effort: Pulmonary effort is normal. No respiratory distress.      Breath sounds: No wheezing, rhonchi or rales.   Chest:   Breasts:     Right: No mass.      Left: No mass.   Abdominal:      General: Bowel sounds are normal. There is no distension.      Palpations: Abdomen is soft. There is no mass.      Tenderness: There is no abdominal tenderness. There is no guarding or rebound.   Genitourinary:     Comments: Patient defers  Musculoskeletal:         General: No swelling or signs of injury.      Cervical back: Normal range of motion and neck supple. No tenderness.      Right lower leg: No edema.      Left lower leg: No edema.   Lymphadenopathy:      Cervical: No cervical adenopathy.      Upper Body:      Right upper body: No supraclavicular or axillary adenopathy.      Left upper body: No supraclavicular or axillary adenopathy.   Skin:     General: Skin is warm and dry.      Coloration: Skin is not pale.      Findings: No bruising or rash.   Neurological:      General: No focal deficit present.      Mental Status: She is alert and oriented to person, place, and time.      Cranial Nerves: No cranial nerve deficit.      Motor: No weakness.      Coordination: Coordination normal.      Gait: Gait normal.   Psychiatric:         Mood and Affect: Mood normal.         Behavior: Behavior normal.         Thought Content: Thought content normal.         Judgment: Judgment normal.         Assessment & Plan    Full age-appropriate comprehensive physical exam and health care maintenance performed and discussed today.  Routine safety and preventative measures discussed including self breast exam, seatbelt use, no drinking and driving, no texting and driving, abstinence or cessation of tobacco use, routine dental and vision exams, healthy diet and regular exercise.    We will update comprehensive labs and follow-up on results once available.  For  mammogram and DEXA.  Both requisitions placed.  Colonoscopy will be due again in September, followed by GI.  EKG as above.  Declines vaccines, have discussed pneumonia, shingles, tetanus and RSV recommendations as well as flu shots and COVID boosters, patient declines all.    In addition to the above-mentioned healthcare maintenance and screening studies, the following were discussed and assessed in detail today:   1.  Hypertension: Excellent control.  No change in therapy.  Update comprehensive labs, contact us if refills needed on Maxide.  2.  Hyperparathyroidism: Last labs were looking more in reasonable ranges, stable, continue monitoring.  If elevates again, would consider endocrine referral.  3.  Vitamin D deficiency: Again, would be ideal to correct before monitoring her parathyroid levels, we will update to see where her levels are at this time and update DEXA as well.  4.  History of hyperlipidemia: Again, due for updated comprehensive labs.    Happy to see her back for brief blood pressure check in 6 months.  She is to contact us with any questions in the interim.

## 2025-05-16 NOTE — PATIENT INSTRUCTIONS
We will follow up on all comprehensive blood work once results are available and make any recommendations based on these results as may be indicated.  Please continue with routine gynecologic exams and annual mammograms.  Please consider updating a bone density scan this year as well.  Colonoscopy will be due in September.  Please consider updating vaccines including a pneumonia shot, shingles vaccine series, RSV vaccine, tetanus vaccine, annual flu shots and COVID boosters.  If you need refills on your medications, please let us know.  Otherwise, we should see you back for a brief blood pressure check in 6 months.  Please reach out with any questions in the interim.

## 2025-05-17 LAB
25(OH)D3+25(OH)D2 SERPL-MCNC: 76 NG/ML (ref 30–100)
ALBUMIN SERPL-MCNC: 4.1 G/DL (ref 3.6–5.1)
ALBUMIN/CREAT UR: NORMAL
ALP SERPL-CCNC: 74 U/L (ref 37–153)
ALT SERPL-CCNC: 18 U/L (ref 6–29)
ANION GAP SERPL CALCULATED.4IONS-SCNC: 6 MMOL/L (CALC) (ref 7–17)
APPEARANCE UR: ABNORMAL
AST SERPL-CCNC: 21 U/L (ref 10–35)
BASOPHILS # BLD AUTO: 21 CELLS/UL (ref 0–200)
BASOPHILS NFR BLD AUTO: 0.4 %
BILIRUB SERPL-MCNC: 0.5 MG/DL (ref 0.2–1.2)
BILIRUB UR QL STRIP: NEGATIVE
BUN SERPL-MCNC: 11 MG/DL (ref 7–25)
CA-I SERPL-MCNC: NORMAL MG/DL
CALCIUM SERPL-MCNC: 10.5 MG/DL (ref 8.6–10.4)
CHLORIDE SERPL-SCNC: 101 MMOL/L (ref 98–110)
CHOLEST SERPL-MCNC: 196 MG/DL
CHOLEST/HDLC SERPL: 2.9 (CALC)
CK SERPL-CCNC: 148 U/L (ref 18–225)
CO2 SERPL-SCNC: 31 MMOL/L (ref 20–32)
COLOR UR: YELLOW
CREAT SERPL-MCNC: 0.87 MG/DL (ref 0.6–1)
CREAT UR-MCNC: NORMAL MG/DL
EGFRCR SERPLBLD CKD-EPI 2021: 70 ML/MIN/1.73M2
EOSINOPHIL # BLD AUTO: 78 CELLS/UL (ref 15–500)
EOSINOPHIL NFR BLD AUTO: 1.5 %
ERYTHROCYTE [DISTWIDTH] IN BLOOD BY AUTOMATED COUNT: 14.1 % (ref 11–15)
EST. AVERAGE GLUCOSE BLD GHB EST-MCNC: 126 MG/DL
EST. AVERAGE GLUCOSE BLD GHB EST-SCNC: 7 MMOL/L
GLUCOSE SERPL-MCNC: 73 MG/DL (ref 65–99)
GLUCOSE UR QL STRIP: NEGATIVE
HBA1C MFR BLD: 6 %
HCT VFR BLD AUTO: 40.7 % (ref 35–45)
HDLC SERPL-MCNC: 68 MG/DL
HGB BLD-MCNC: 12.5 G/DL (ref 11.7–15.5)
HGB UR QL STRIP: NEGATIVE
IRON SATN MFR SERPL: 20 % (CALC) (ref 16–45)
IRON SERPL-MCNC: 63 MCG/DL (ref 45–160)
KETONES UR QL STRIP: NEGATIVE
LDLC SERPL CALC-MCNC: 110 MG/DL (CALC)
LEUKOCYTE ESTERASE UR QL STRIP: NEGATIVE
LYMPHOCYTES # BLD AUTO: 2070 CELLS/UL (ref 850–3900)
LYMPHOCYTES NFR BLD AUTO: 39.8 %
MCH RBC QN AUTO: 29.6 PG (ref 27–33)
MCHC RBC AUTO-ENTMCNC: 30.7 G/DL (ref 32–36)
MCV RBC AUTO: 96.2 FL (ref 80–100)
MICROALBUMIN UR-MCNC: NORMAL
MONOCYTES # BLD AUTO: 447 CELLS/UL (ref 200–950)
MONOCYTES NFR BLD AUTO: 8.6 %
NEUTROPHILS # BLD AUTO: 2584 CELLS/UL (ref 1500–7800)
NEUTROPHILS NFR BLD AUTO: 49.7 %
NITRITE UR QL STRIP: NEGATIVE
NONHDLC SERPL-MCNC: 128 MG/DL (CALC)
PH UR STRIP: 7 [PH] (ref 5–8)
PLATELET # BLD AUTO: 317 THOUSAND/UL (ref 140–400)
PMV BLD REES-ECKER: 10.3 FL (ref 7.5–12.5)
POTASSIUM SERPL-SCNC: 4.3 MMOL/L (ref 3.5–5.3)
PROT SERPL-MCNC: 7.9 G/DL (ref 6.1–8.1)
PROT UR QL STRIP: NEGATIVE
PTH-INTACT SERPL-MCNC: 63 PG/ML (ref 16–77)
RBC # BLD AUTO: 4.23 MILLION/UL (ref 3.8–5.1)
SODIUM SERPL-SCNC: 138 MMOL/L (ref 135–146)
SP GR UR STRIP: 1.01 (ref 1–1.03)
TIBC SERPL-MCNC: 315 MCG/DL (CALC) (ref 250–450)
TRIGL SERPL-MCNC: 89 MG/DL
TSH SERPL-ACNC: 0.83 MIU/L (ref 0.4–4.5)
URATE SERPL-MCNC: 5.2 MG/DL (ref 2.5–7)
VIT B12 SERPL-MCNC: 450 PG/ML (ref 200–1100)
WBC # BLD AUTO: 5.2 THOUSAND/UL (ref 3.8–10.8)

## 2025-05-19 LAB
25(OH)D3+25(OH)D2 SERPL-MCNC: 76 NG/ML (ref 30–100)
ALBUMIN SERPL-MCNC: 4.1 G/DL (ref 3.6–5.1)
ALBUMIN/CREAT UR: NORMAL MG/G CREAT
ALP SERPL-CCNC: 74 U/L (ref 37–153)
ALT SERPL-CCNC: 18 U/L (ref 6–29)
ANION GAP SERPL CALCULATED.4IONS-SCNC: 6 MMOL/L (CALC) (ref 7–17)
APPEARANCE UR: ABNORMAL
AST SERPL-CCNC: 21 U/L (ref 10–35)
BASOPHILS # BLD AUTO: 21 CELLS/UL (ref 0–200)
BASOPHILS NFR BLD AUTO: 0.4 %
BILIRUB SERPL-MCNC: 0.5 MG/DL (ref 0.2–1.2)
BILIRUB UR QL STRIP: NEGATIVE
BUN SERPL-MCNC: 11 MG/DL (ref 7–25)
CA-I SERPL-MCNC: 5.7 MG/DL (ref 4.7–5.5)
CALCIUM SERPL-MCNC: 10.5 MG/DL (ref 8.6–10.4)
CHLORIDE SERPL-SCNC: 101 MMOL/L (ref 98–110)
CHOLEST SERPL-MCNC: 196 MG/DL
CHOLEST/HDLC SERPL: 2.9 (CALC)
CK SERPL-CCNC: 148 U/L (ref 18–225)
CO2 SERPL-SCNC: 31 MMOL/L (ref 20–32)
COLOR UR: YELLOW
CREAT SERPL-MCNC: 0.87 MG/DL (ref 0.6–1)
CREAT UR-MCNC: 90 MG/DL (ref 20–275)
EGFRCR SERPLBLD CKD-EPI 2021: 70 ML/MIN/1.73M2
EOSINOPHIL # BLD AUTO: 78 CELLS/UL (ref 15–500)
EOSINOPHIL NFR BLD AUTO: 1.5 %
ERYTHROCYTE [DISTWIDTH] IN BLOOD BY AUTOMATED COUNT: 14.1 % (ref 11–15)
EST. AVERAGE GLUCOSE BLD GHB EST-MCNC: 126 MG/DL
EST. AVERAGE GLUCOSE BLD GHB EST-SCNC: 7 MMOL/L
GLUCOSE SERPL-MCNC: 73 MG/DL (ref 65–99)
GLUCOSE UR QL STRIP: NEGATIVE
HBA1C MFR BLD: 6 %
HCT VFR BLD AUTO: 40.7 % (ref 35–45)
HDLC SERPL-MCNC: 68 MG/DL
HGB BLD-MCNC: 12.5 G/DL (ref 11.7–15.5)
HGB UR QL STRIP: NEGATIVE
IRON SATN MFR SERPL: 20 % (CALC) (ref 16–45)
IRON SERPL-MCNC: 63 MCG/DL (ref 45–160)
KETONES UR QL STRIP: NEGATIVE
LDLC SERPL CALC-MCNC: 110 MG/DL (CALC)
LEUKOCYTE ESTERASE UR QL STRIP: NEGATIVE
LYMPHOCYTES # BLD AUTO: 2070 CELLS/UL (ref 850–3900)
LYMPHOCYTES NFR BLD AUTO: 39.8 %
MCH RBC QN AUTO: 29.6 PG (ref 27–33)
MCHC RBC AUTO-ENTMCNC: 30.7 G/DL (ref 32–36)
MCV RBC AUTO: 96.2 FL (ref 80–100)
MICROALBUMIN UR-MCNC: <0.2 MG/DL
MONOCYTES # BLD AUTO: 447 CELLS/UL (ref 200–950)
MONOCYTES NFR BLD AUTO: 8.6 %
NEUTROPHILS # BLD AUTO: 2584 CELLS/UL (ref 1500–7800)
NEUTROPHILS NFR BLD AUTO: 49.7 %
NITRITE UR QL STRIP: NEGATIVE
NONHDLC SERPL-MCNC: 128 MG/DL (CALC)
PH UR STRIP: 7 [PH] (ref 5–8)
PLATELET # BLD AUTO: 317 THOUSAND/UL (ref 140–400)
PMV BLD REES-ECKER: 10.3 FL (ref 7.5–12.5)
POTASSIUM SERPL-SCNC: 4.3 MMOL/L (ref 3.5–5.3)
PROT SERPL-MCNC: 7.9 G/DL (ref 6.1–8.1)
PROT UR QL STRIP: NEGATIVE
PTH-INTACT SERPL-MCNC: 63 PG/ML (ref 16–77)
RBC # BLD AUTO: 4.23 MILLION/UL (ref 3.8–5.1)
SODIUM SERPL-SCNC: 138 MMOL/L (ref 135–146)
SP GR UR STRIP: 1.01 (ref 1–1.03)
TIBC SERPL-MCNC: 315 MCG/DL (CALC) (ref 250–450)
TRIGL SERPL-MCNC: 89 MG/DL
TSH SERPL-ACNC: 0.83 MIU/L (ref 0.4–4.5)
URATE SERPL-MCNC: 5.2 MG/DL (ref 2.5–7)
VIT B12 SERPL-MCNC: 450 PG/ML (ref 200–1100)
WBC # BLD AUTO: 5.2 THOUSAND/UL (ref 3.8–10.8)

## 2025-05-30 ENCOUNTER — APPOINTMENT (OUTPATIENT)
Dept: RADIOLOGY | Facility: CLINIC | Age: 75
End: 2025-05-30
Payer: COMMERCIAL

## 2025-05-30 VITALS — HEIGHT: 62 IN | BODY MASS INDEX: 31.28 KG/M2 | WEIGHT: 170 LBS

## 2025-05-30 DIAGNOSIS — Z12.31 ENCOUNTER FOR SCREENING MAMMOGRAM FOR MALIGNANT NEOPLASM OF BREAST: ICD-10-CM

## 2025-05-30 PROCEDURE — 77063 BREAST TOMOSYNTHESIS BI: CPT | Performed by: RADIOLOGY

## 2025-05-30 PROCEDURE — 77063 BREAST TOMOSYNTHESIS BI: CPT

## 2025-05-30 PROCEDURE — 77067 SCR MAMMO BI INCL CAD: CPT | Performed by: RADIOLOGY

## 2025-06-09 ENCOUNTER — APPOINTMENT (OUTPATIENT)
Dept: OPHTHALMOLOGY | Facility: CLINIC | Age: 75
End: 2025-06-09
Payer: COMMERCIAL

## 2025-06-09 DIAGNOSIS — I10 HYPERTENSION, UNSPECIFIED TYPE: Primary | ICD-10-CM

## 2025-06-09 RX ORDER — TRIAMTERENE AND HYDROCHLOROTHIAZIDE 37.5; 25 MG/1; MG/1
1 TABLET ORAL DAILY
Qty: 90 TABLET | Refills: 1 | Status: SHIPPED | OUTPATIENT
Start: 2025-06-09

## 2025-06-20 ENCOUNTER — APPOINTMENT (OUTPATIENT)
Dept: OPHTHALMOLOGY | Facility: CLINIC | Age: 75
End: 2025-06-20
Payer: COMMERCIAL

## 2025-06-20 DIAGNOSIS — H43.813 POSTERIOR VITREOUS DETACHMENT OF BOTH EYES: Primary | ICD-10-CM

## 2025-06-20 DIAGNOSIS — H53.15 VISUAL DISTORTIONS OF SHAPE AND SIZE: ICD-10-CM

## 2025-06-20 DIAGNOSIS — D31.32 CHOROIDAL NEVUS, LEFT: ICD-10-CM

## 2025-06-20 PROCEDURE — 92250 FUNDUS PHOTOGRAPHY W/I&R: CPT

## 2025-06-20 PROCEDURE — 99213 OFFICE O/P EST LOW 20 MIN: CPT

## 2025-06-20 ASSESSMENT — SLIT LAMP EXAM - LIDS
COMMENTS: NORMAL
COMMENTS: NORMAL

## 2025-06-20 ASSESSMENT — ENCOUNTER SYMPTOMS
GASTROINTESTINAL NEGATIVE: 0
HEMATOLOGIC/LYMPHATIC NEGATIVE: 0
EYES NEGATIVE: 1
RESPIRATORY NEGATIVE: 0
ENDOCRINE NEGATIVE: 0
CARDIOVASCULAR NEGATIVE: 0
PSYCHIATRIC NEGATIVE: 0
MUSCULOSKELETAL NEGATIVE: 0
CONSTITUTIONAL NEGATIVE: 0
NEUROLOGICAL NEGATIVE: 0
ALLERGIC/IMMUNOLOGIC NEGATIVE: 0

## 2025-06-20 ASSESSMENT — VISUAL ACUITY
OS_CC: 20/25
OD_CC+: -2
METHOD: SNELLEN - LINEAR
OS_CC+: -2
OD_CC: 20/40
CORRECTION_TYPE: GLASSES

## 2025-06-20 ASSESSMENT — TONOMETRY
OD_IOP_MMHG: 13
OS_IOP_MMHG: 12
IOP_METHOD: GOLDMANN APPLANATION

## 2025-06-20 ASSESSMENT — PACHYMETRY
OD_CT(UM): 544
OS_CT(UM): 538

## 2025-06-20 ASSESSMENT — CUP TO DISC RATIO
OS_RATIO: 0.4
OD_RATIO: 0.4

## 2025-06-20 ASSESSMENT — EXTERNAL EXAM - RIGHT EYE: OD_EXAM: NORMAL

## 2025-06-20 ASSESSMENT — EXTERNAL EXAM - LEFT EYE: OS_EXAM: NORMAL

## 2025-06-20 NOTE — PROGRESS NOTES
#Choroidal nevus left eye  -Small, flat, drusen, no pigment, not concerning    Imaging    Macula OCT 06/10/24  Right eye (OD): Normal contour and appearance, no IRF/subretinal fluid (SRF)  Left eye (OS): Normal contour and appearance, no IRF/subretinal fluid (SRF)    #PVD both eyes (OU)   - Stable single persistent floater right eye, no new flashes, no floaters OS  - No tears, breaks, or holes noted on  exam both eyes   - OCT macula clear both eyes both eyes (OU)     #Dry eye syndrome  - On ATs qid both eyes (OU)      #Cataract right eye (OD)>left eye (OS)   - Noted to have 2+ NS, 1-2+ posterior subcapsular cataract (PSC), and 1+ cortical spoking right eye right eye (OD) with mild cataract left eye (OS)   -BCVA was limited last time byt improved today, patient feels likely was secondayr to dryeness  - Cataracts not bothering patient at this time, prefers to monitor    1 year

## 2026-06-24 ENCOUNTER — APPOINTMENT (OUTPATIENT)
Dept: OPHTHALMOLOGY | Facility: CLINIC | Age: 76
End: 2026-06-24
Payer: COMMERCIAL